# Patient Record
Sex: FEMALE | Race: WHITE | NOT HISPANIC OR LATINO | Employment: FULL TIME | URBAN - METROPOLITAN AREA
[De-identification: names, ages, dates, MRNs, and addresses within clinical notes are randomized per-mention and may not be internally consistent; named-entity substitution may affect disease eponyms.]

---

## 2020-03-23 ENCOUNTER — HOSPITAL ENCOUNTER (EMERGENCY)
Facility: CLINIC | Age: 50
Discharge: HOME | End: 2020-03-23
Attending: EMERGENCY MEDICINE | Admitting: EMERGENCY MEDICINE
Payer: COMMERCIAL

## 2020-03-23 ENCOUNTER — APPOINTMENT (OUTPATIENT)
Dept: GENERAL RADIOLOGY | Facility: CLINIC | Age: 50
End: 2020-03-23
Attending: EMERGENCY MEDICINE
Payer: COMMERCIAL

## 2020-03-23 VITALS
OXYGEN SATURATION: 96 % | WEIGHT: 170 LBS | HEART RATE: 65 BPM | TEMPERATURE: 98.2 F | BODY MASS INDEX: 30.12 KG/M2 | HEIGHT: 63 IN | RESPIRATION RATE: 17 BRPM | SYSTOLIC BLOOD PRESSURE: 124 MMHG | DIASTOLIC BLOOD PRESSURE: 72 MMHG

## 2020-03-23 DIAGNOSIS — R07.9 CHEST PAIN, UNSPECIFIED TYPE: ICD-10-CM

## 2020-03-23 LAB
ANION GAP SERPL CALCULATED.3IONS-SCNC: 5 MMOL/L (ref 3–14)
BASOPHILS # BLD AUTO: 0 10E9/L (ref 0–0.2)
BASOPHILS NFR BLD AUTO: 0.4 %
BUN SERPL-MCNC: 19 MG/DL (ref 7–30)
CALCIUM SERPL-MCNC: 8.9 MG/DL (ref 8.5–10.1)
CHLORIDE SERPL-SCNC: 106 MMOL/L (ref 94–109)
CO2 SERPL-SCNC: 25 MMOL/L (ref 20–32)
CREAT SERPL-MCNC: 0.96 MG/DL (ref 0.52–1.04)
DIFFERENTIAL METHOD BLD: ABNORMAL
EOSINOPHIL # BLD AUTO: 0.1 10E9/L (ref 0–0.7)
EOSINOPHIL NFR BLD AUTO: 1.3 %
ERYTHROCYTE [DISTWIDTH] IN BLOOD BY AUTOMATED COUNT: 11.4 % (ref 10–15)
GFR SERPL CREATININE-BSD FRML MDRD: 70 ML/MIN/{1.73_M2}
GLUCOSE SERPL-MCNC: 120 MG/DL (ref 70–99)
HCT VFR BLD AUTO: 40.7 % (ref 35–47)
HGB BLD-MCNC: 13.7 G/DL (ref 11.7–15.7)
IMM GRANULOCYTES # BLD: 0 10E9/L (ref 0–0.4)
IMM GRANULOCYTES NFR BLD: 0.1 %
LYMPHOCYTES # BLD AUTO: 3.4 10E9/L (ref 0.8–5.3)
LYMPHOCYTES NFR BLD AUTO: 43.1 %
MCH RBC QN AUTO: 33.2 PG (ref 26.5–33)
MCHC RBC AUTO-ENTMCNC: 33.7 G/DL (ref 31.5–36.5)
MCV RBC AUTO: 99 FL (ref 78–100)
MONOCYTES # BLD AUTO: 0.5 10E9/L (ref 0–1.3)
MONOCYTES NFR BLD AUTO: 6 %
NEUTROPHILS # BLD AUTO: 3.8 10E9/L (ref 1.6–8.3)
NEUTROPHILS NFR BLD AUTO: 49.1 %
NRBC # BLD AUTO: 0 10*3/UL
NRBC BLD AUTO-RTO: 0 /100
PLATELET # BLD AUTO: 404 10E9/L (ref 150–450)
POTASSIUM SERPL-SCNC: 3.8 MMOL/L (ref 3.4–5.3)
RBC # BLD AUTO: 4.13 10E12/L (ref 3.8–5.2)
SODIUM SERPL-SCNC: 136 MMOL/L (ref 133–144)
TROPONIN I SERPL-MCNC: <0.015 UG/L (ref 0–0.04)
TROPONIN I SERPL-MCNC: <0.015 UG/L (ref 0–0.04)
WBC # BLD AUTO: 7.8 10E9/L (ref 4–11)

## 2020-03-23 PROCEDURE — 80048 BASIC METABOLIC PNL TOTAL CA: CPT | Performed by: EMERGENCY MEDICINE

## 2020-03-23 PROCEDURE — 99285 EMERGENCY DEPT VISIT HI MDM: CPT | Mod: 25

## 2020-03-23 PROCEDURE — 84484 ASSAY OF TROPONIN QUANT: CPT | Performed by: EMERGENCY MEDICINE

## 2020-03-23 PROCEDURE — 93005 ELECTROCARDIOGRAM TRACING: CPT

## 2020-03-23 PROCEDURE — 25000132 ZZH RX MED GY IP 250 OP 250 PS 637: Performed by: EMERGENCY MEDICINE

## 2020-03-23 PROCEDURE — 85025 COMPLETE CBC W/AUTO DIFF WBC: CPT | Performed by: EMERGENCY MEDICINE

## 2020-03-23 PROCEDURE — 71046 X-RAY EXAM CHEST 2 VIEWS: CPT

## 2020-03-23 RX ORDER — ASPIRIN 81 MG/1
324 TABLET, CHEWABLE ORAL ONCE
Status: COMPLETED | OUTPATIENT
Start: 2020-03-23 | End: 2020-03-23

## 2020-03-23 RX ADMIN — ASPIRIN 81 MG 324 MG: 81 TABLET ORAL at 12:49

## 2020-03-23 ASSESSMENT — ENCOUNTER SYMPTOMS
COUGH: 0
VOMITING: 0
ABDOMINAL PAIN: 0
DIAPHORESIS: 0
FEVER: 0
PALPITATIONS: 1
NERVOUS/ANXIOUS: 1
SHORTNESS OF BREATH: 0
NAUSEA: 0

## 2020-03-23 ASSESSMENT — MIFFLIN-ST. JEOR: SCORE: 1365.24

## 2020-03-23 NOTE — ED TRIAGE NOTES
Pt presents with complaints of L sided chest pain and states she feels as if her heart is racing. Pt denies cardiac hx. Pt states this has been going on for approximately 1 hour.

## 2020-03-23 NOTE — ED AVS SNAPSHOT
Emergency Department  [address] [facility] [address]  Phone:  [phone]  Fax:  [phone]                                    Ninfa Simons   MRN: [id number]    Department:   Emergency Department   Date of Visit:  3/23/2020           After Visit Summary Signature Page    I have received my discharge instructions, and my questions have been answered. I have discussed any challenges I see with this plan with the nurse or doctor.    ..........................................................................................................................................  Patient/Patient Representative Signature      ..........................................................................................................................................  Patient Representative Print Name and Relationship to Patient    ..................................................               ................................................  Date                                   Time    ..........................................................................................................................................  Reviewed by Signature/Title    ...................................................              ..............................................  Date                                               Time          22EPIC Rev 08/18

## 2020-03-23 NOTE — ED PROVIDER NOTES
"  History     Chief Complaint:  Chest Pain     HPI   Ninfa Simons is a 49 year old female who presents for evaluation of chest pain. Today around 1130 the patient was at work when she started to develop brief intermittent episodes of squeezing and stabbing left-sided chest pain with some associated palpitations, described as the sensation of a rapid heart rate. Since then the patient has felt somewhat anxious about her new chest pain, and she checked her blood pressure and found it to be elevated. Due to this new chest pain, the patient came into the ED for evaluation. Otherwise, she denies any recent diaphoresis, fever, cough, shortness of breath, abdominal pain, nausea, or vomiting.     Cardiac Risk Factors:  CAD:    Negative   Hypertension:   Negative   Hyperlipidemia:  Negative   Diabetes:   Negative   Tobacco use:   Negative   Gender:   Female   Age:    49  Familial Hx of CAD:  Negative      Allergies:  NKDA      Medications:    Albuterol inhaler      Past Medical History:    Asthma   Gestational diabetes     Past Surgical History:    History reviewed. No pertinent past surgical history.     Family History:    History reviewed. No pertinent family history.     Social History:  Tobacco use:    Never smoker   Alcohol use:    Positive   Drug use:    Negative   Marital status:    Single   Accompanied to ED by:  Alone       Review of Systems   Constitutional: Negative for diaphoresis and fever.   Respiratory: Negative for cough and shortness of breath.    Cardiovascular: Positive for chest pain and palpitations.   Gastrointestinal: Negative for abdominal pain, nausea and vomiting.   Psychiatric/Behavioral: The patient is nervous/anxious.    All other systems reviewed and are negative.    Physical Exam   First Vitals:  BP: (!) 174/91  Pulse: 102  Heart Rate: 102  Temp: 98.2  F (36.8  C)  Resp: 20  Height: 160 cm (5' 3\")  Weight: 77.1 kg (170 lb)  SpO2: 97 %    Physical Exam  General/Appearance: appears stated age, " well-groomed, appears comfortable but anxious  Eyes: EOMI, no scleral injection, no icterus  ENT: MMM  Neck: supple, nl ROM, no stiffness  Cardiovascular: mild tachy, nl S1S2, no m/r/g, 2+ pulses in all 4 extremities, cap refill <2sec  Respiratory: CTAB, good air movement throughout, no wheezes/rhonchi/rales, no increased WOB, no retractions  Back: no lesions  GI: abd soft, non-distended, nttp,  no HSM, no rebound, no guarding, nl BS  MSK: WALLER, good tone, no bony abnormality  Skin: warm and well-perfused, no rash, no edema, no ecchymosis, nl turgor  Neuro: GCS 15, alert and oriented, no gross focal neuro deficits  Psych: interacts appropriately, anxious  Heme: no petechia, no purpura, no active bleeding     Emergency Department Course   ECG (12:30:32):  Indication: Screening for cardiovascular disease.   Rate 92 bpm. SD interval 154 ms. QRS duration 82 ms. QT/QTc 354/437 ms. P-R-T axes 47 31 27.   Interpretation: Normal sinus rhythm, Normal ECG   Agree with computer interpretation. Yes   Interpreted at 1235 by Dr. Maciel.      Imaging:  Radiographic findings were communicated with the patient who voiced understanding of the findings.    XR Chest:  IMPRESSION: No acute cardiopulmonary disease.   Per radiology.     Laboratory:  CBC: WNL (WBC 7.8, HGB 13.7, )   BMP: Glucose 120 high, o/w WNL (Creatinine 0.96)   Troponin I 1326: <0.015   Troponin I 1555: <0.015     Interventions:  1249 Aspirin 324 mg PO     Emergency Department Course:  Nursing notes and vitals reviewed.  1236: I performed an exam of the patient as documented above.     1344: I updated and reassessed the patient.     1630: I updated and reassessed the patient.     Findings and plan explained to the Patient. Patient discharged home with instructions regarding supportive care, medications, and reasons to return. The importance of close follow-up was reviewed.     Impression & Plan      Medical Decision Making:  This pt presents with chest pain of  unclear etiology.  At this time I do not suspect that there is an acute/dangerous pathology for the chest pain.  They have no significant personal/family history of cardiac ds.  They have no significant cardiac risk factors.  Their EKG and CXR were unremarkable.  Her delta trops were neg and I feel their risk is low-enough to not warrant delta trops; an outpat stresstest has been ordered.  I have also considered PE but they are have no respiratory sxs, no tachypnea or hypoxia, and no pleuritic pain. There are no focal infiltrates, effusions, pulm edema, or evidence of PTX on CXR. The pt has not had recent fevers or viral infections to suggest pericarditis.  They are at low risk for aortic pathology and have nl aortic contour on CXR.  They have not had recent chest trauma.  All of this was discussed with the pt and they were reassurred.  I have advised them to follow-up with their PCP in the next 3 days to get further evaluation, and to return to the ED sooner if their CP continues/worsens, they develop severe SOB/fevers/lightheadedness, or they develop any other new and concerning sxs. At this point the pt is HD stable and appropriate for discharge.      Diagnosis:    ICD-10-CM   1. Chest pain, unspecified type  R07.9     Disposition:  Discharged to home.       I, Enoch Alford, am serving as a scribe at 12:36 PM on 3/23/2020 to document services personally performed by Dr. Maciel, based on my observations and the provider's statements to me.     EMERGENCY DEPARTMENT       Corry Maciel MD  03/23/20 [id number]

## 2020-12-20 ENCOUNTER — HEALTH MAINTENANCE LETTER (OUTPATIENT)
Age: 50
End: 2020-12-20

## 2021-10-03 ENCOUNTER — HEALTH MAINTENANCE LETTER (OUTPATIENT)
Age: 51
End: 2021-10-03

## 2022-01-23 ENCOUNTER — HEALTH MAINTENANCE LETTER (OUTPATIENT)
Age: 52
End: 2022-01-23

## 2022-09-11 ENCOUNTER — HEALTH MAINTENANCE LETTER (OUTPATIENT)
Age: 52
End: 2022-09-11

## 2022-10-26 PROBLEM — Z13.6 CARDIOVASCULAR SCREENING; LDL GOAL LESS THAN 130: Status: ACTIVE | Noted: 2022-10-26

## 2022-10-26 PROBLEM — R73.03 PREDIABETES: Status: ACTIVE | Noted: 2022-10-26

## 2022-10-28 ENCOUNTER — OFFICE VISIT (OUTPATIENT)
Dept: INTERNAL MEDICINE | Facility: CLINIC | Age: 52
End: 2022-10-28
Payer: COMMERCIAL

## 2022-10-28 VITALS
OXYGEN SATURATION: 97 % | BODY MASS INDEX: 30.65 KG/M2 | WEIGHT: 173 LBS | HEIGHT: 63 IN | TEMPERATURE: 97.6 F | DIASTOLIC BLOOD PRESSURE: 89 MMHG | SYSTOLIC BLOOD PRESSURE: 129 MMHG | HEART RATE: 74 BPM

## 2022-10-28 DIAGNOSIS — Z80.41 FAMILY HISTORY OF MALIGNANT NEOPLASM OF OVARY: ICD-10-CM

## 2022-10-28 DIAGNOSIS — Z11.4 SCREENING FOR HIV (HUMAN IMMUNODEFICIENCY VIRUS): ICD-10-CM

## 2022-10-28 DIAGNOSIS — E66.811 CLASS 1 OBESITY DUE TO EXCESS CALORIES WITHOUT SERIOUS COMORBIDITY IN ADULT, UNSPECIFIED BMI: ICD-10-CM

## 2022-10-28 DIAGNOSIS — Z12.31 ENCOUNTER FOR SCREENING MAMMOGRAM FOR BREAST CANCER: ICD-10-CM

## 2022-10-28 DIAGNOSIS — Z00.01 ENCOUNTER FOR GENERAL ADULT MEDICAL EXAMINATION WITH ABNORMAL FINDINGS: Primary | ICD-10-CM

## 2022-10-28 DIAGNOSIS — Z11.59 NEED FOR HEPATITIS C SCREENING TEST: ICD-10-CM

## 2022-10-28 DIAGNOSIS — Z23 NEED FOR PROPHYLACTIC VACCINATION AND INOCULATION AGAINST INFLUENZA: ICD-10-CM

## 2022-10-28 DIAGNOSIS — J45.20 MILD INTERMITTENT ASTHMA WITHOUT COMPLICATION: ICD-10-CM

## 2022-10-28 DIAGNOSIS — E66.09 CLASS 1 OBESITY DUE TO EXCESS CALORIES WITHOUT SERIOUS COMORBIDITY IN ADULT, UNSPECIFIED BMI: ICD-10-CM

## 2022-10-28 DIAGNOSIS — R73.03 PREDIABETES: ICD-10-CM

## 2022-10-28 DIAGNOSIS — Z13.6 CARDIOVASCULAR SCREENING; LDL GOAL LESS THAN 130: ICD-10-CM

## 2022-10-28 DIAGNOSIS — Z12.11 COLON CANCER SCREENING: ICD-10-CM

## 2022-10-28 DIAGNOSIS — Z12.4 SCREENING FOR MALIGNANT NEOPLASM OF CERVIX: ICD-10-CM

## 2022-10-28 DIAGNOSIS — R03.0 ELEVATED BLOOD PRESSURE READING WITHOUT DIAGNOSIS OF HYPERTENSION: ICD-10-CM

## 2022-10-28 DIAGNOSIS — Z12.11 SCREEN FOR COLON CANCER: ICD-10-CM

## 2022-10-28 LAB
ANION GAP SERPL CALCULATED.3IONS-SCNC: 6 MMOL/L (ref 3–14)
BUN SERPL-MCNC: 15 MG/DL (ref 7–30)
CALCIUM SERPL-MCNC: 9.5 MG/DL (ref 8.5–10.1)
CHLORIDE BLD-SCNC: 105 MMOL/L (ref 94–109)
CHOLEST SERPL-MCNC: 241 MG/DL
CO2 SERPL-SCNC: 27 MMOL/L (ref 20–32)
CREAT SERPL-MCNC: 0.84 MG/DL (ref 0.52–1.04)
CREAT UR-MCNC: 140 MG/DL
FASTING STATUS PATIENT QL REPORTED: YES
GFR SERPL CREATININE-BSD FRML MDRD: 83 ML/MIN/1.73M2
GLUCOSE BLD-MCNC: 110 MG/DL (ref 70–99)
HBA1C MFR BLD: 5.8 % (ref 0–5.6)
HCV AB SERPL QL IA: NONREACTIVE
HDLC SERPL-MCNC: 93 MG/DL
HIV 1+2 AB+HIV1 P24 AG SERPL QL IA: NONREACTIVE
LDLC SERPL CALC-MCNC: 135 MG/DL
MICROALBUMIN UR-MCNC: 32 MG/L
MICROALBUMIN/CREAT UR: 22.86 MG/G CR (ref 0–25)
NONHDLC SERPL-MCNC: 148 MG/DL
POTASSIUM BLD-SCNC: 4 MMOL/L (ref 3.4–5.3)
SODIUM SERPL-SCNC: 138 MMOL/L (ref 133–144)
TRIGL SERPL-MCNC: 66 MG/DL
TSH SERPL DL<=0.005 MIU/L-ACNC: 2 MU/L (ref 0.4–4)

## 2022-10-28 PROCEDURE — 83036 HEMOGLOBIN GLYCOSYLATED A1C: CPT | Performed by: INTERNAL MEDICINE

## 2022-10-28 PROCEDURE — 82043 UR ALBUMIN QUANTITATIVE: CPT | Performed by: INTERNAL MEDICINE

## 2022-10-28 PROCEDURE — 90471 IMMUNIZATION ADMIN: CPT | Performed by: INTERNAL MEDICINE

## 2022-10-28 PROCEDURE — 90472 IMMUNIZATION ADMIN EACH ADD: CPT | Performed by: INTERNAL MEDICINE

## 2022-10-28 PROCEDURE — 90682 RIV4 VACC RECOMBINANT DNA IM: CPT | Performed by: INTERNAL MEDICINE

## 2022-10-28 PROCEDURE — 87624 HPV HI-RISK TYP POOLED RSLT: CPT | Performed by: INTERNAL MEDICINE

## 2022-10-28 PROCEDURE — G0145 SCR C/V CYTO,THINLAYER,RESCR: HCPCS | Performed by: INTERNAL MEDICINE

## 2022-10-28 PROCEDURE — 80061 LIPID PANEL: CPT | Performed by: INTERNAL MEDICINE

## 2022-10-28 PROCEDURE — 80048 BASIC METABOLIC PNL TOTAL CA: CPT | Performed by: INTERNAL MEDICINE

## 2022-10-28 PROCEDURE — 99386 PREV VISIT NEW AGE 40-64: CPT | Mod: 25 | Performed by: INTERNAL MEDICINE

## 2022-10-28 PROCEDURE — 99213 OFFICE O/P EST LOW 20 MIN: CPT | Mod: 25 | Performed by: INTERNAL MEDICINE

## 2022-10-28 PROCEDURE — 84443 ASSAY THYROID STIM HORMONE: CPT | Performed by: INTERNAL MEDICINE

## 2022-10-28 PROCEDURE — 86803 HEPATITIS C AB TEST: CPT | Performed by: INTERNAL MEDICINE

## 2022-10-28 PROCEDURE — 87389 HIV-1 AG W/HIV-1&-2 AB AG IA: CPT | Performed by: INTERNAL MEDICINE

## 2022-10-28 PROCEDURE — 36415 COLL VENOUS BLD VENIPUNCTURE: CPT | Performed by: INTERNAL MEDICINE

## 2022-10-28 PROCEDURE — 90715 TDAP VACCINE 7 YRS/> IM: CPT | Performed by: INTERNAL MEDICINE

## 2022-10-28 RX ORDER — ALBUTEROL SULFATE 90 UG/1
2 AEROSOL, METERED RESPIRATORY (INHALATION) EVERY 6 HOURS
Qty: 18 G | Refills: 1 | Status: SHIPPED | OUTPATIENT
Start: 2022-10-28 | End: 2023-10-26

## 2022-10-28 ASSESSMENT — ENCOUNTER SYMPTOMS
CHILLS: 0
SHORTNESS OF BREATH: 0
PALPITATIONS: 0
WEAKNESS: 0
HEADACHES: 0
HEMATOCHEZIA: 0
COUGH: 1
NAUSEA: 0
MYALGIAS: 0
HEARTBURN: 0
PARESTHESIAS: 0
ARTHRALGIAS: 0
CONSTIPATION: 0
HEMATURIA: 0
DIZZINESS: 0
DYSURIA: 0
FEVER: 0
JOINT SWELLING: 0
BREAST MASS: 0
SORE THROAT: 0
DIARRHEA: 0
ABDOMINAL PAIN: 0
EYE PAIN: 0
NERVOUS/ANXIOUS: 0
FREQUENCY: 0

## 2022-10-28 NOTE — PROGRESS NOTES
"   SUBJECTIVE:   CC: Ninfa is an 52 year old who presents for preventive health visit.     51 y/o F here for AFE.    H/o prediabetes,  Intermittent asthma, elevated BP w/o Dx HTN    Works full time for     > cough past 5 weeks.   > had an episode of cp with elevated BP.  Went to ER for w/u, she did not f/u with the stress test.   This a couple years ago.  No episodes since.  ER visit reviewed.    She is very active without any cardiopulmonary symptoms in meantime    > glucose value during 3/2020 ER visit was 120 (non fasting)    > gained about 35# prior to 2020.    She is unable to keep weight at 170 if she \"works out every day\".   Will creep back if does not do this.   She does not restrict calories, but \"does not eat sweets\".   > had OGTT and was \"prdiabetic\"          Patient has been advised of split billing requirements and indicates understanding: Yes  Healthy Habits:     Getting at least 3 servings of Calcium per day:  Yes    Bi-annual eye exam:  Yes    Dental care twice a year:  Yes    Sleep apnea or symptoms of sleep apnea:  Excessive snoring    Diet:  Regular (no restrictions)    Frequency of exercise:  4-5 days/week    Duration of exercise:  Greater than 60 minutes    Taking medications regularly:  Not Applicable    Medication side effects:  Not applicable    PHQ-2 Total Score: 0    Additional concerns today:  Yes      Discuss Blood sugar, weight gain, tingling in her hands and feet, discuss metformin, possible hernia     Today's PHQ-2 Score: No flowsheet data found.    Abuse: Current or Past (Physical, Sexual or Emotional) - No  Do you feel safe in your environment? Yes    Have you ever done Advance Care Planning? (For example, a Health Directive, POLST, or a discussion with a medical provider or your loved ones about your wishes): No, advance care planning information given to patient to review.  Patient plans to discuss their wishes with loved ones or provider.      Social History     Tobacco Use     " Smoking status: Never     Smokeless tobacco: Not on file   Substance Use Topics     Alcohol use: Yes     Comment: sometimes      If you drink alcohol do you typically have >3 drinks per day or >7 drinks per week? No    No flowsheet data found.    Reviewed orders with patient.  Reviewed health maintenance and updated orders accordingly - Yes  Lab work is in process  Labs reviewed in EPIC  BP Readings from Last 3 Encounters:   10/28/22 129/89   03/23/20 124/72   02/20/15 (!) 128/92    Wt Readings from Last 3 Encounters:   10/28/22 78.5 kg (173 lb)   03/23/20 77.1 kg (170 lb)   02/20/15 65.8 kg (145 lb)                  Patient Active Problem List   Diagnosis     CARDIOVASCULAR SCREENING; LDL GOAL LESS THAN 130     Prediabetes     Elevated blood pressure reading without diagnosis of hypertension     History reviewed. No pertinent surgical history.    Social History     Tobacco Use     Smoking status: Never     Smokeless tobacco: Not on file   Substance Use Topics     Alcohol use: Yes     Comment: sometimes      History reviewed. No pertinent family history.      Current Outpatient Medications   Medication Sig Dispense Refill     albuterol (PROAIR HFA/PROVENTIL HFA/VENTOLIN HFA) 108 (90 Base) MCG/ACT inhaler Inhale 2 puffs into the lungs every 6 hours 18 g 1     metFORMIN (GLUCOPHAGE) 500 MG tablet Take 1 tablet (500 mg) by mouth 2 times daily (with meals) 180 tablet 3     No Known Allergies  Recent Labs   Lab Test 03/23/20  1300   CR 0.96   GFRESTIMATED 70   GFRESTBLACK 81   POTASSIUM 3.8        Breast Cancer Screening:  Any new diagnosis of family breast, ovarian, or bowel cancer? Yes         FHS-7: No flowsheet data found.  Her mom had ovarian cancer.  She is not sure if she wants genetic screening.  She is aware that she may be eligible and will let me know     Mammogram Screening: Recommended annual mammography  Pertinent mammograms are reviewed under the imaging tab.    History of abnormal Pap smear: Last 3 Pap  "Results: No results found for: PAP     Reviewed and updated as needed this visit by clinical staff                  Reviewed and updated as needed this visit by Provider                 Past Medical History:   Diagnosis Date     Gestational diabetes      Uncomplicated asthma       History reviewed. No pertinent surgical history.    Review of Systems   Constitutional: Negative for chills and fever.   HENT: Negative for congestion, ear pain, hearing loss and sore throat.    Eyes: Negative for pain and visual disturbance.   Respiratory: Positive for cough. Negative for shortness of breath.    Cardiovascular: Negative for chest pain, palpitations and peripheral edema.   Gastrointestinal: Negative for abdominal pain, constipation, diarrhea, heartburn, hematochezia and nausea.   Breasts:  Negative for tenderness, breast mass and discharge.   Genitourinary: Negative for dysuria, frequency, genital sores, hematuria, pelvic pain, urgency, vaginal bleeding and vaginal discharge.   Musculoskeletal: Negative for arthralgias, joint swelling and myalgias.   Skin: Negative for rash.   Neurological: Negative for dizziness, weakness, headaches and paresthesias.   Psychiatric/Behavioral: Negative for mood changes. The patient is not nervous/anxious.            OBJECTIVE:   /89 (BP Location: Right arm, Patient Position: Sitting, Cuff Size: Adult Regular)   Pulse 74   Temp 97.6  F (36.4  C) (Oral)   Ht 1.588 m (5' 2.5\")   Wt 78.5 kg (173 lb)   SpO2 97%   BMI 31.14 kg/m    Physical Exam  GENERAL APPEARANCE: healthy, alert and no distress  EYES: Eyes grossly normal to inspection, PERRL and conjunctivae and sclerae normal  HENT: ear canals and TM's normal, nose and mouth without ulcers or lesions, oropharynx clear and oral mucous membranes moist  NECK: no adenopathy, no asymmetry, masses, or scars and thyroid normal to palpation  RESP: lungs clear to auscultation - no rales, rhonchi or wheezes  BREAST: normal without masses, " tenderness or nipple discharge and no palpable axillary masses or adenopathy  CV: regular rate and rhythm, normal S1 S2, no S3 or S4, no murmur, click or rub, no peripheral edema and peripheral pulses strong  ABDOMEN: soft, nontender, no hepatosplenomegaly, no masses and bowel sounds normal   (female): normal female external genitalia, normal urethral meatus, vaginal mucosal atrophy noted, normal cervix, adnexae, and uterus without masses or abnormal discharge   (female):  Pap done with excellent view of cervix  MS: no musculoskeletal defects are noted and gait is age appropriate without ataxia  SKIN: no suspicious lesions or rashes  NEURO: Normal strength and tone, sensory exam grossly normal, mentation intact and speech normal  PSYCH: mentation appears normal and affect normal/bright   She was initially tearful due to some anxiety about her health in general.  Content of speech = wants to be preventative and avoid chronic disease such as DM     Diagnostic Test Results:  Labs reviewed in Epic  No results found for this or any previous visit (from the past 24 hour(s)).  See orders, pending.     ASSESSMENT/PLAN:   (Z00.01) Encounter for general adult medical examination with abnormal findings  (primary encounter diagnosis)  Comment:  Generally healthy 53 y/o F, resuming her preventative health.   Plan:  Below:     (Z13.6) CARDIOVASCULAR SCREENING; LDL GOAL LESS THAN 130  Comment:    Plan: Lipid panel reflex to direct LDL Fasting             (R73.03) Prediabetes   Reports abnormal OGTTs, gestational DM history.    Comment: discussed DM diet.  She is already exercising.  Add metformin for presumed insulin resistance  Plan: Hemoglobin A1c, TSH with free T4 reflex,         metFORMIN (GLUCOPHAGE) 500 MG tablet, Albumin         Random Urine Quantitative with Creat Ratio             (Z12.11) Colon cancer screening  Comment:  Due      Plan: Colonoscopy Screening  Referral             (E66.09) Class 1 obesity due  "to excess calories without serious comorbidity in adult, unspecified BMI  Comment:  Discussed.    Plan: embracing DM diet, will result in weight loss if followed.      (R03.0) Elevated blood pressure reading without diagnosis of hypertension  Comment:  I think she had COVID in march 2020   No HTN or chest symptoms since.    Plan:      (J45.20) Mild intermittent asthma without complication  Comment:  Needs RF   Plan: albuterol (PROAIR HFA/PROVENTIL HFA/VENTOLIN         HFA) 108 (90 Base) MCG/ACT inhaler             (Z12.11) Screen for colon cancer  Comment:    Plan: Colonoscopy Screening  Referral             (Z11.4) Screening for HIV (human immunodeficiency virus)  Comment: agreeable   Plan: HIV Antigen Antibody Combo             (Z11.59) Need for hepatitis C screening test  Comment: agreeable   Plan: Hepatitis C Screen Reflex to HCV RNA Quant and         Genotype             (Z12.4) Screening for malignant neoplasm of cervix  Comment:  PAP done with excellent view of cervix   Plan:      (Z12.31) Encounter for screening mammogram for breast cancer  Comment:    Plan: *MA Screening Digital Bilateral, Pap screen         with HPV - recommended age 30 - 65 years             (Z23) Need for prophylactic vaccination and inoculation against influenza  Comment:  Done   Plan: INFLUENZA QUAD, RECOMBINANT, P-FREE (RIV4)         (FLUBLOK)             (Z80.41) Family history of malignant neoplasm of ovary  Comment:  She knows she is possibly eligible for genetic screening   Plan: wants to defer decision. She is invited to let us know at anytime if referal desired.           COUNSELING:  Reviewed preventive health counseling, as reflected in patient instructions       Healthy diet/nutrition       Immunizations    Vaccinated for: Influenza          Estimated body mass index is 30.11 kg/m  as calculated from the following:    Height as of 3/23/20: 1.6 m (5' 3\").    Weight as of 3/23/20: 77.1 kg (170 lb).    Weight management " plan: embrace a 1800 k[place]/day DM diet.      She reports that she has never smoked. She does not have any smokeless tobacco history on file.      Counseling Resources:  ATP IV Guidelines  Pooled Cohorts Equation Calculator  Breast Cancer Risk Calculator  BRCA-Related Cancer Risk Assessment: FHS-7 Tool  FRAX Risk Assessment  ICSI Preventive Guidelines  Dietary Guidelines for Americans, 2010  food[email or web address]'s MyPlate  ASA Prophylaxis  Lung CA Screening    Sarah Davila MD  [facility]

## 2022-10-28 NOTE — PATIENT INSTRUCTIONS
"  1800 or 2000 ADA diabetic diet    Trial Metformin 500 mg       Take two with supper daily    Colonoscopy will call you    Call to schedule imaging  OR 1 [phone];  Mammogram    Return to clinic 6 months follow up    \"Ventral hernia\"   consider surgery referral in future.     You are eligible for shingrix vaccine series... do via pharmacy at your convenience.       "

## 2022-10-29 ENCOUNTER — MYC MEDICAL ADVICE (OUTPATIENT)
Dept: INTERNAL MEDICINE | Facility: CLINIC | Age: 52
End: 2022-10-29

## 2022-10-31 NOTE — RESULT ENCOUNTER NOTE
"Ninfa Simons    The kidney function is normal:  no concerns  The HgbA1C is in the \"prediabetes\" range.   Preventing onset of diabetes would best be achieved with weight loss of a few pounds as discussed as well as embracing a low calorie diabetes diet.  As discussed, we will start metformin to help with improving insulin resistance.     Cholesterol is elevated, if you want to start a low dose cholesterol medication (atorvastatin for example) let me know and we can start.       Thyroid, electrolytes and kidney function look great  HIV and Hepatitis C screening are negative/normal.   Thank you for participating in this population screening effort.     Sincerely,       MARY SHERMAN M.D.  "

## 2022-11-01 LAB
BKR LAB AP GYN ADEQUACY: NORMAL
BKR LAB AP GYN INTERPRETATION: NORMAL
BKR LAB AP HPV REFLEX: NORMAL
BKR LAB AP PREVIOUS ABNORMAL: NORMAL
PATH REPORT.COMMENTS IMP SPEC: NORMAL
PATH REPORT.COMMENTS IMP SPEC: NORMAL
PATH REPORT.RELEVANT HX SPEC: NORMAL

## 2022-11-02 LAB
HUMAN PAPILLOMA VIRUS 16 DNA: NEGATIVE
HUMAN PAPILLOMA VIRUS 18 DNA: NEGATIVE
HUMAN PAPILLOMA VIRUS FINAL DIAGNOSIS: NORMAL
HUMAN PAPILLOMA VIRUS OTHER HR: NEGATIVE

## 2023-04-30 ENCOUNTER — HEALTH MAINTENANCE LETTER (OUTPATIENT)
Age: 53
End: 2023-04-30

## 2023-10-26 ENCOUNTER — OFFICE VISIT (OUTPATIENT)
Dept: FAMILY MEDICINE | Facility: CLINIC | Age: 53
End: 2023-10-26
Payer: COMMERCIAL

## 2023-10-26 VITALS
SYSTOLIC BLOOD PRESSURE: 136 MMHG | BODY MASS INDEX: 31.47 KG/M2 | HEIGHT: 63 IN | OXYGEN SATURATION: 96 % | HEART RATE: 76 BPM | WEIGHT: 177.6 LBS | DIASTOLIC BLOOD PRESSURE: 84 MMHG | RESPIRATION RATE: 16 BRPM | TEMPERATURE: 97 F

## 2023-10-26 DIAGNOSIS — J45.20 MILD INTERMITTENT ASTHMA WITHOUT COMPLICATION: ICD-10-CM

## 2023-10-26 DIAGNOSIS — Z12.11 SCREEN FOR COLON CANCER: Chronic | ICD-10-CM

## 2023-10-26 DIAGNOSIS — Z13.6 CARDIOVASCULAR SCREENING; LDL GOAL LESS THAN 130: ICD-10-CM

## 2023-10-26 DIAGNOSIS — Z12.31 ENCOUNTER FOR SCREENING MAMMOGRAM FOR BREAST CANCER: ICD-10-CM

## 2023-10-26 DIAGNOSIS — Z13.6 SCREENING FOR HEART DISEASE: ICD-10-CM

## 2023-10-26 DIAGNOSIS — R73.03 PREDIABETES: Primary | ICD-10-CM

## 2023-10-26 LAB — HBA1C MFR BLD: 5.8 % (ref 0–5.6)

## 2023-10-26 PROCEDURE — 80053 COMPREHEN METABOLIC PANEL: CPT | Performed by: FAMILY MEDICINE

## 2023-10-26 PROCEDURE — 99204 OFFICE O/P NEW MOD 45 MIN: CPT | Performed by: FAMILY MEDICINE

## 2023-10-26 PROCEDURE — 83036 HEMOGLOBIN GLYCOSYLATED A1C: CPT | Performed by: FAMILY MEDICINE

## 2023-10-26 PROCEDURE — 80061 LIPID PANEL: CPT | Performed by: FAMILY MEDICINE

## 2023-10-26 PROCEDURE — 36415 COLL VENOUS BLD VENIPUNCTURE: CPT | Performed by: FAMILY MEDICINE

## 2023-10-26 RX ORDER — ALBUTEROL SULFATE 90 UG/1
2 AEROSOL, METERED RESPIRATORY (INHALATION) EVERY 6 HOURS
Qty: 18 G | Refills: 1 | Status: SHIPPED | OUTPATIENT
Start: 2023-10-26

## 2023-10-26 ASSESSMENT — PAIN SCALES - GENERAL: PAINLEVEL: NO PAIN (0)

## 2023-10-26 ASSESSMENT — ASTHMA QUESTIONNAIRES: ACT_TOTALSCORE: 23

## 2023-10-26 NOTE — PROGRESS NOTES
Assessment & Plan     Prediabetes  Continues in the prediabetic range with current metformin treatment.  Discussed other treatment options for assistance with weight loss.  Increase in exercise and dietary measures are planned at this time.  - HEMOGLOBIN A1C; Future  - metFORMIN (GLUCOPHAGE) 500 MG tablet; Take 1 tablet (500 mg) by mouth 2 times daily (with meals)  - Comprehensive metabolic panel (BMP + Alb, Alk Phos, ALT, AST, Total. Bili, TP); Future  - HEMOGLOBIN A1C  - Comprehensive metabolic panel (BMP + Alb, Alk Phos, ALT, AST, Total. Bili, TP)    Mild intermittent asthma without complication  Asthma under good control with occasional use of albuterol.  Refills given.  - albuterol (PROAIR HFA/PROVENTIL HFA/VENTOLIN HFA) 108 (90 Base) MCG/ACT inhaler; Inhale 2 puffs into the lungs every 6 hours    CARDIOVASCULAR SCREENING; LDL GOAL LESS THAN 130  The 10-year ASCVD risk score (Raymond GREENWOOD, et al., 2019) is: 1.4%    Values used to calculate the score:      Age: 53 years      Sex: Female      Is Non- : No      Diabetic: No      Tobacco smoker: No      Systolic Blood Pressure: 136 mmHg      Is BP treated: No      HDL Cholesterol: 89 mg/dL      Total Cholesterol: 251 mg/dL   - Lipid panel reflex to direct LDL Fasting; Future  - Lipid panel reflex to direct LDL Fasting    Screening for heart disease  Overall risk for heart disease is relatively low but we discussed other assessments and she is interested in potentially pursuing a coronary artery calcium scan for more long-term assessment.  Order placed and she will check her insurance coverage to determine if she will pursue this testing.  - CT Coronary Calcium Scan; Future    Screen for colon cancer  - Colonoscopy Screening  Referral; Future    Encounter for screening mammogram for breast cancer  - *MA Screening Digital Bilateral; Future    Ordering of each unique test  Prescription drug management         BMI:   Estimated body mass  "index is 31.97 kg/m  as calculated from the following:    Height as of this encounter: 1.588 m (5' 2.5\").    Weight as of this encounter: 80.6 kg (177 lb 9.6 oz).   Weight management plan: Discussed healthy diet and exercise guidelines    Patient Instructions   Lab today.    Refill medications.    For heart disease screening consider calcium scan.   You can call [phone] to schedule this at the [facility] building in [place] - someone will call to schedule this.     Deanna Basurto MD  [facility]    Tima Ocasio is a 53 year old, presenting for the following health issues:  Recheck Medication        10/26/2023    10:16 AM   Additional Questions   Roomed by DANY Duncan   Accompanied by Self         10/26/2023    10:16 AM   Patient Reported Additional Medications   Patient reports taking the following new medications None       History of Present Illness       Reason for visit:  Blood sugar issues/med refill    She eats 2-3 servings of fruits and vegetables daily.She consumes 0 sweetened beverage(s) daily.She exercises with enough effort to increase her heart rate 20 to 29 minutes per day.  She exercises with enough effort to increase her heart rate 4 days per week.   She is taking medications regularly.         Asthma Follow-Up    Was ACT completed today?  Yes        10/26/2023    10:13 AM   ACT Total Scores   ACT TOTAL SCORE (Goal Greater than or Equal to 20) 23   In the past 12 months, how many times did you visit the emergency room for your asthma without being admitted to the hospital? 0   In the past 12 months, how many times were you hospitalized overnight because of your asthma? 0        How many days per week do you miss taking your asthma controller medication?  I do not have an asthma controller medication  Please describe any recent triggers for your asthma: exercise or sports  Have you had any Emergency Room Visits, Urgent Care Visits, or Hospital " "Admissions since your last office visit?  No  Dizziness and fatigue and nausea with return to kickboxing.  Palpitations initially.  No symptoms now except cough in the initial onset of exercise.  Improved with albuterol use.     Prediabetes:  Has been treated with metformin over the last year for prediabetes concerns.  She has tolerated the metformin well.  Has continued to struggle with difficulty losing weight.  Wt Readings from Last 5 Encounters:   10/26/23 80.6 kg (177 lb 9.6 oz)   10/28/22 78.5 kg (173 lb)   03/23/20 77.1 kg (170 lb)   02/20/15 65.8 kg (145 lb)             The 10-year ASCVD risk score (Raymond GREENWOOD, et al., 2019) is: 1.4%    Values used to calculate the score:      Age: 53 years      Sex: Female      Is Non- : No      Diabetic: No      Tobacco smoker: No      Systolic Blood Pressure: 136 mmHg      Is BP treated: No      HDL Cholesterol: 89 mg/dL      Total Cholesterol: 251 mg/dL    Review of Systems   Constitutional, HEENT, cardiovascular, pulmonary, gi and gu systems are negative, except as otherwise noted.      Objective    /84   Pulse 76   Temp 97  F (36.1  C) (Tympanic)   Resp 16   Ht 1.588 m (5' 2.5\")   Wt 80.6 kg (177 lb 9.6 oz)   LMP 08/01/2023 (Approximate)   SpO2 96%   BMI 31.97 kg/m    Body mass index is 31.97 kg/m .  Physical Exam   GENERAL: alert, no distress, and obese  NECK: no adenopathy, no asymmetry, masses, or scars and thyroid normal to palpation  RESP: lungs clear to auscultation - no rales, rhonchi or wheezes  CV: regular rate and rhythm, normal S1 S2, no S3 or S4, no murmur, click or rub, no peripheral edema and peripheral pulses strong  ABDOMEN: soft, nontender, no hepatosplenomegaly, no masses and bowel sounds normal  MS: no gross musculoskeletal defects noted, no edema  NEURO: Normal strength and tone, mentation intact and speech normal  BACK: no CVA tenderness, no paralumbar tenderness  PSYCH: mentation appears normal, affect " normal/bright    Results for orders placed or performed in visit on 10/26/23   HEMOGLOBIN A1C     Status: Abnormal   Result Value Ref Range    Hemoglobin A1C 5.8 (H) 0.0 - 5.6 %   Lipid panel reflex to direct LDL Fasting     Status: Abnormal   Result Value Ref Range    Cholesterol 251 (H) <200 mg/dL    Triglycerides 79 <150 mg/dL    Direct Measure HDL 89 >=50 mg/dL    LDL Cholesterol Calculated 146 (H) <=100 mg/dL    Non HDL Cholesterol 162 (H) <130 mg/dL    Narrative    Cholesterol  Desirable:  <200 mg/dL    Triglycerides  Normal:  Less than 150 mg/dL  Borderline High:  150-199 mg/dL  High:  200-499 mg/dL  Very High:  Greater than or equal to 500 mg/dL    Direct Measure HDL  Female:  Greater than or equal to 50 mg/dL   Male:  Greater than or equal to 40 mg/dL    LDL Cholesterol  Desirable:  <100mg/dL  Above Desirable:  100-129 mg/dL   Borderline High:  130-159 mg/dL   High:  160-189 mg/dL   Very High:  >= 190 mg/dL    Non HDL Cholesterol  Desirable:  130 mg/dL  Above Desirable:  130-159 mg/dL  Borderline High:  160-189 mg/dL  High:  190-219 mg/dL  Very High:  Greater than or equal to 220 mg/dL   Comprehensive metabolic panel (BMP + Alb, Alk Phos, ALT, AST, Total. Bili, TP)     Status: Abnormal   Result Value Ref Range    Sodium 139 135 - 145 mmol/L    Potassium 4.3 3.4 - 5.3 mmol/L    Carbon Dioxide (CO2) 23 22 - 29 mmol/L    Anion Gap 14 7 - 15 mmol/L    Urea Nitrogen 15.1 6.0 - 20.0 mg/dL    Creatinine 0.91 0.51 - 0.95 mg/dL    GFR Estimate 75 >60 mL/min/1.73m2    Calcium 9.6 8.6 - 10.0 mg/dL    Chloride 102 98 - 107 mmol/L    Glucose 105 (H) 70 - 99 mg/dL    Alkaline Phosphatase 52 35 - 104 U/L    AST 21 0 - 45 U/L    ALT 19 0 - 50 U/L    Protein Total 7.4 6.4 - 8.3 g/dL    Albumin 4.5 3.5 - 5.2 g/dL    Bilirubin Total 0.4 <=1.2 mg/dL

## 2023-10-26 NOTE — LETTER
My Asthma Action Plan    Name: Ninfa Simons   YOB: 1970  Date: 10/27/2023   My doctor: Deanna Basurto MD   My clinic: [facility]        My Rescue Medicine:   Albuterol inhaler (Proair/Ventolin/Proventil HFA)  2-4 puffs EVERY 4 HOURS as needed. Use a spacer if recommended by your provider.   My Asthma Severity:   Intermittent / Exercise Induced  Know your asthma triggers: exercise or sports             GREEN ZONE   Good Control  I feel good  No cough or wheeze  Can work, sleep and play without asthma symptoms       Take your asthma control medicine every day.     If exercise triggers your asthma, take your rescue medication  15 minutes before exercise or sports, and  During exercise if you have asthma symptoms  Spacer to use with inhaler: If you have a spacer, make sure to use it with your inhaler             YELLOW ZONE Getting Worse  I have ANY of these:  I do not feel good  Cough or wheeze  Chest feels tight  Wake up at night   Keep taking your Green Zone medications  Start taking your rescue medicine:  every 20 minutes for up to 1 hour. Then every 4 hours for 24-48 hours.  If you stay in the Yellow Zone for more than 12-24 hours, contact your doctor.  If you do not return to the Green Zone in 12-24 hours or you get worse, start taking your oral steroid medicine if prescribed by your provider.           RED ZONE Medical Alert - Get Help  I have ANY of these:  I feel awful  Medicine is not helping  Breathing getting harder  Trouble walking or talking  Nose opens wide to breathe       Take your rescue medicine NOW  If your provider has prescribed an oral steroid medicine, start taking it NOW  Call your doctor NOW  If you are still in the Red Zone after 20 minutes and you have not reached your doctor:  Take your rescue medicine again and  Call 911 or go to the emergency room right away    See your regular doctor within 2 weeks of an Emergency Room or Urgent Care visit  for follow-up treatment.          Annual Reminders:  Meet with Asthma Educator,  Flu Shot in the Fall, consider Pneumonia Vaccination for patients with asthma (aged 19 and older).    [facility] [place] [place] [facility] [id number] [address] [place] [address]    Electronically signed by Deanna Basurto MD   Date: 10/27/23                    Asthma Triggers  How To Control Things That Make Your Asthma Worse    Triggers are things that make your asthma worse.  Look at the list below to help you find your triggers and   what you can do about them. You can help prevent asthma flare-ups by staying away from your triggers.      Trigger                                                          What you can do   Cigarette Smoke  Tobacco smoke can make asthma worse. Do not allow smoking in your home, car or around you.  Be sure no one smokes at a child s day care or school.  If you smoke, ask your health care provider for ways to help you quit.  Ask family members to quit too.  Ask your health care provider for a referral to Quit Plan to help you quit smoking, or call 6-859-930-PLAN.     Colds, Flu, Bronchitis  These are common triggers of asthma. Wash your hands often.  Don t touch your eyes, nose or mouth.  Get a flu shot every year.     Dust Mites  These are tiny bugs that live in cloth or carpet. They are too small to see. Wash sheets and blankets in hot water every week.   Encase pillows and mattress in dust mite proof covers.  Avoid having carpet if you can. If you have carpet, vacuum weekly.   Use a dust mask and HEPA vacuum.   Pollen and Outdoor Mold  Some people are allergic to trees, grass, or weed pollen, or molds. Try to keep your windows closed.  Limit time out doors when pollen count is high.   Ask you health care provider about taking medicine during allergy season.     Animal Dander  Some people are allergic to skin flakes, urine or saliva from pets with fur or feathers. Keep pets with fur or feathers out of  your home.    If you can t keep the pet outdoors, then keep the pet out of your bedroom.  Keep the bedroom door closed.  Keep pets off cloth furniture and away from stuffed toys.     Mice, Rats, and Cockroaches  Some people are allergic to the waste from these pests.   Cover food and garbage.  Clean up spills and food crumbs.  Store grease in the refrigerator.   Keep food out of the bedroom.   Indoor Mold  This can be a trigger if your home has high moisture. Fix leaking faucets, pipes, or other sources of water.   Clean moldy surfaces.  Dehumidify basement if it is damp and smelly.   Smoke, Strong Odors, and Sprays  These can reduce air quality. Stay away from strong odors and sprays, such as perfume, powder, hair spray, paints, smoke incense, paint, cleaning products, candles and new carpet.   Exercise or Sports  Some people with asthma have this trigger. Be active!  Ask your doctor about taking medicine before sports or exercise to prevent symptoms.    Warm up for 5-10 minutes before and after sports or exercise.     Other Triggers of Asthma  Cold air:  Cover your nose and mouth with a scarf.  Sometimes laughing or crying can be a trigger.  Some medicines and food can trigger asthma.

## 2023-10-26 NOTE — PATIENT INSTRUCTIONS
Lab today.    Refill medications.    For heart disease screening consider calcium scan.   You can call [phone] to schedule this at the [facility] building in [place]     Colonoscopy - someone will call to schedule this.

## 2023-10-27 LAB
ALBUMIN SERPL BCG-MCNC: 4.5 G/DL (ref 3.5–5.2)
ALP SERPL-CCNC: 52 U/L (ref 35–104)
ALT SERPL W P-5'-P-CCNC: 19 U/L (ref 0–50)
ANION GAP SERPL CALCULATED.3IONS-SCNC: 14 MMOL/L (ref 7–15)
AST SERPL W P-5'-P-CCNC: 21 U/L (ref 0–45)
BILIRUB SERPL-MCNC: 0.4 MG/DL
BUN SERPL-MCNC: 15.1 MG/DL (ref 6–20)
CALCIUM SERPL-MCNC: 9.6 MG/DL (ref 8.6–10)
CHLORIDE SERPL-SCNC: 102 MMOL/L (ref 98–107)
CHOLEST SERPL-MCNC: 251 MG/DL
CREAT SERPL-MCNC: 0.91 MG/DL (ref 0.51–0.95)
DEPRECATED HCO3 PLAS-SCNC: 23 MMOL/L (ref 22–29)
EGFRCR SERPLBLD CKD-EPI 2021: 75 ML/MIN/1.73M2
GLUCOSE SERPL-MCNC: 105 MG/DL (ref 70–99)
HDLC SERPL-MCNC: 89 MG/DL
LDLC SERPL CALC-MCNC: 146 MG/DL
NONHDLC SERPL-MCNC: 162 MG/DL
POTASSIUM SERPL-SCNC: 4.3 MMOL/L (ref 3.4–5.3)
PROT SERPL-MCNC: 7.4 G/DL (ref 6.4–8.3)
SODIUM SERPL-SCNC: 139 MMOL/L (ref 135–145)
TRIGL SERPL-MCNC: 79 MG/DL

## 2023-10-27 NOTE — RESULT ENCOUNTER NOTE
The 10-year ASCVD risk score (Raymond GREENWOOD, et al., 2019) is: 1.4%    Values used to calculate the score:      Age: 53 years      Sex: Female      Is Non- : No      Diabetic: No      Tobacco smoker: No      Systolic Blood Pressure: 136 mmHg      Is BP treated: No      HDL Cholesterol: 89 mg/dL      Total Cholesterol: 251 mg/dL   Your cholesterol levels are similar to previous.  HDL (good cholesterol) is still very high which is protective against heart disease for you.  LDL cholesterol (bad cholesterol) is in the borderline high range.  Overall risk for heart disease remains low based on these results and assessment of other risk factors.  Healthy diet and exercise is recommended.  The coronary artery calcium scan that we discussed at your visit may give some additional insight into cardiac risk if you determined to have that done.  Otherwise repeat cholesterol testing in a year is recommended.  Your kidney and liver testing were normal.  Blood sugar testing with the long-term blood sugar test (A1c) is unchanged from a year ago and you remain in the prediabetic range.  Continued metformin use is recommended.  Please call or MyChart message me if you have any questions.      ARNIE

## 2023-12-10 ENCOUNTER — HEALTH MAINTENANCE LETTER (OUTPATIENT)
Age: 53
End: 2023-12-10

## 2023-12-18 ENCOUNTER — OFFICE VISIT (OUTPATIENT)
Dept: INTERNAL MEDICINE | Facility: CLINIC | Age: 53
End: 2023-12-18
Payer: COMMERCIAL

## 2023-12-18 VITALS
DIASTOLIC BLOOD PRESSURE: 90 MMHG | HEART RATE: 77 BPM | OXYGEN SATURATION: 97 % | SYSTOLIC BLOOD PRESSURE: 140 MMHG | TEMPERATURE: 98 F | BODY MASS INDEX: 31.32 KG/M2 | WEIGHT: 174 LBS

## 2023-12-18 DIAGNOSIS — Z00.00 PREVENTATIVE HEALTH CARE: Primary | ICD-10-CM

## 2023-12-18 DIAGNOSIS — Z12.11 SCREEN FOR COLON CANCER: ICD-10-CM

## 2023-12-18 DIAGNOSIS — R42 VERTIGO: ICD-10-CM

## 2023-12-18 PROCEDURE — 99396 PREV VISIT EST AGE 40-64: CPT | Performed by: INTERNAL MEDICINE

## 2023-12-18 ASSESSMENT — ENCOUNTER SYMPTOMS
DIARRHEA: 0
EYE PAIN: 0
SHORTNESS OF BREATH: 0
ARTHRALGIAS: 0
FREQUENCY: 0
WEAKNESS: 0
BREAST MASS: 0
PARESTHESIAS: 0
HEARTBURN: 0
JOINT SWELLING: 0
COUGH: 1
NERVOUS/ANXIOUS: 0
SORE THROAT: 0
DIZZINESS: 1
HEMATOCHEZIA: 0
PALPITATIONS: 0
HEMATURIA: 0
ABDOMINAL PAIN: 0
FEVER: 0
CHILLS: 0
MYALGIAS: 0
CONSTIPATION: 0
NAUSEA: 0
DYSURIA: 0
HEADACHES: 0

## 2023-12-18 ASSESSMENT — ASTHMA QUESTIONNAIRES: ACT_TOTALSCORE: 23

## 2023-12-18 NOTE — PROGRESS NOTES
SUBJECTIVE:   Ninfa is a 53 year old, presenting for the following:    Fasting.     Physical        12/18/2023     4:49 PM   Additional Questions   Roomed by TED De La Cruz LPN   Accompanied by self         12/18/2023     4:49 PM   Patient Reported Additional Medications   Patient reports taking the following new medications none       Healthy Habits:     Getting at least 3 servings of Calcium per day:  Yes    Bi-annual eye exam:  Yes    Dental care twice a year:  Yes    Sleep apnea or symptoms of sleep apnea:  Excessive snoring    Diet:  Regular (no restrictions)    Frequency of exercise:  2-3 days/week    Duration of exercise:  15-30 minutes    Taking medications regularly:  Yes    Medication side effects:  None    Additional concerns today:  Yes      Today's PHQ-2 Score:       12/18/2023     4:29 PM   PHQ-2 ( 1999 [employer])   Q1: Little interest or pleasure in doing things 0   Q2: Feeling down, depressed or hopeless 0   PHQ-2 Score 0   Q1: Little interest or pleasure in doing things Not at all   Q2: Feeling down, depressed or hopeless Not at all   PHQ-2 Score 0           Social History     Tobacco Use    Smoking status: Never    Smokeless tobacco: Not on file   Substance Use Topics    Alcohol use: Yes     Comment: sometimes              12/18/2023     4:29 PM   Alcohol Use   Prescreen: >3 drinks/day or >7 drinks/week? No     Reviewed orders with patient.  Reviewed health maintenance and updated orders accordingly - Yes  BP Readings from Last 3 Encounters:   12/18/23 (!) 140/90   10/26/23 136/84   10/28/22 129/89    Wt Readings from Last 3 Encounters:   12/18/23 78.9 kg (174 lb)   10/26/23 80.6 kg (177 lb 9.6 oz)   10/28/22 78.5 kg (173 lb)                    Breast Cancer Screening:    FHS-7:       10/28/2022     8:24 AM 12/18/2023     4:32 PM   Breast CA Risk Assessment (FHS-7)   Did any of your first-degree relatives have breast or ovarian cancer? Yes Yes   Did any of your relatives have bilateral breast cancer?  No No   Did any man in your family have breast cancer? No No   Did any woman in your family have breast and ovarian cancer? Yes Yes   Did any woman in your family have breast cancer before age 50 y? No No   Do you have 2 or more relatives with breast and/or ovarian cancer? No No   Do you have 2 or more relatives with breast and/or bowel cancer? No No       Mammogram Screening: Recommended annual mammography  Pertinent mammograms are reviewed under the imaging tab.    History of abnormal Pap smear: NO - age 30-65 PAP every 5 years with negative HPV co-testing recommended      Latest Ref Rng & Units 10/28/2022     9:07 AM   PAP / HPV   PAP  Negative for Intraepithelial Lesion or Malignancy (NILM)    HPV 16 DNA Negative Negative    HPV 18 DNA Negative Negative    Other HR HPV Negative Negative      Reviewed and updated as needed this visit by clinical staff   Tobacco  Allergies  Meds  Problems  Med Hx  Surg Hx  Fam Hx          Reviewed and updated as needed this visit by Provider                     Review of Systems   Constitutional:  Negative for chills and fever.   HENT:  Negative for congestion, ear pain, hearing loss and sore throat.    Eyes:  Negative for pain and visual disturbance.   Respiratory:  Positive for cough. Negative for shortness of breath.    Cardiovascular:  Negative for chest pain, palpitations and peripheral edema.   Gastrointestinal:  Negative for abdominal pain, constipation, diarrhea, heartburn, hematochezia and nausea.   Breasts:  Negative for tenderness, breast mass and discharge.   Genitourinary:  Negative for dysuria, frequency, genital sores, hematuria, pelvic pain, urgency, vaginal bleeding and vaginal discharge.   Musculoskeletal:  Negative for arthralgias, joint swelling and myalgias.   Skin:  Negative for rash.   Neurological:  Positive for dizziness. Negative for weakness, headaches and paresthesias.   Psychiatric/Behavioral:  Negative for mood changes. The patient is not  nervous/anxious.      IN November while at work she had the sudden onset of just not feeling right. She had some lightheadedness chest pressure and eventually nausea. A few days later when she still did not feel good she went to the ER. Work up was negative but then later she Varivax booster discussed but in short supply-will give at later date severe dizziness to the point she could not work and could hardly walk. She di find one position she was comfortable in. About 10 days later she went o urgent care for severe dizziness and nausea. She was treated with meclizine and sent home. Her symptoms have lessoned but still does not feel right. And she says she is getting intermittent moderate headaches.     She does do kick boxing 2-3 x per week. She has a history of migraine headaches but has not had them for years.     Denies excessive or increased stress.      OBJECTIVE:   BP (!) 144/93   Pulse 77   Temp 98  F (36.7  C) (Oral)   Wt 78.9 kg (174 lb)   LMP 08/01/2023 (Approximate)   SpO2 97%   Breastfeeding No   BMI 31.32 kg/m    Physical Exam  GENERAL: healthy, alert and no distress  EYES: Eyes grossly normal to inspection, PERRL and conjunctivae and sclerae normal  HENT: ear canals and TM's normal, nose and mouth without ulcers or lesions  NECK: no adenopathy, no asymmetry, masses, or scars and thyroid normal to palpation  RESP: lungs clear to auscultation - no rales, rhonchi or wheezes  CV: regular rate and rhythm, normal S1 S2, no S3 or S4, no murmur, click or rub, no peripheral edema and peripheral pulses strong  ABDOMEN: soft, nontender, no hepatosplenomegaly, no masses and bowel sounds normal  MS: no gross musculoskeletal defects noted, no edema  SKIN: no suspicious lesions or rashes  NEURO: Normal strength and tone, mentation intact and speech normal  PSYCH: mentation appears normal, affect normal/bright      ASSESSMENT/PLAN:   (Z00.00) Preventative health care  (primary encounter diagnosis)  Comment:     Plan: labs up to date    (R42) Vertigo  Comment: Will check MRI and refer to ENT. Possible this is all benign positional vertigo related to kick boxing but symptoms seem extreme for that.   Plan: MR Brain w/o & w Contrast, Adult ENT          Referral             (Z12.11) Screen for colon cancer  Comment: will hold on colonoscopy until vertigo issue settled.   Plan: Colonoscopy Screening  Referral             Patient has been advised of split billing requirements and indicates understanding: Yes      COUNSELING:  Reviewed preventive health counseling, as reflected in patient instructions       Regular exercise       Healthy diet/nutrition        She reports that she has never smoked. She does not have any smokeless tobacco history on file.          Nel Austin MD  [facility]

## 2024-01-11 ENCOUNTER — MYC MEDICAL ADVICE (OUTPATIENT)
Dept: INTERNAL MEDICINE | Facility: CLINIC | Age: 54
End: 2024-01-11
Payer: COMMERCIAL

## 2024-01-11 DIAGNOSIS — F41.9 ANXIETY: ICD-10-CM

## 2024-01-11 DIAGNOSIS — R42 VERTIGO: Primary | ICD-10-CM

## 2024-01-11 NOTE — TELEPHONE ENCOUNTER
Please see patient's mychart message below.    Has a brain MRI scheduled 1/15/24 - diagnosis is vertigo.  She is also asking for an order for an MRA - see her reasoning.    Please advise, thanks.  Routed to covering providers.

## 2024-01-13 RX ORDER — LORAZEPAM 1 MG/1
1 TABLET ORAL
Qty: 4 TABLET | Refills: 0 | Status: SHIPPED | OUTPATIENT
Start: 2024-01-13 | End: 2024-09-09

## 2024-01-15 ENCOUNTER — ANCILLARY PROCEDURE (OUTPATIENT)
Dept: MRI IMAGING | Facility: CLINIC | Age: 54
End: 2024-01-15
Attending: INTERNAL MEDICINE
Payer: COMMERCIAL

## 2024-01-15 DIAGNOSIS — R42 VERTIGO: ICD-10-CM

## 2024-01-15 PROCEDURE — 70553 MRI BRAIN STEM W/O & W/DYE: CPT | Mod: GC | Performed by: RADIOLOGY

## 2024-01-15 PROCEDURE — A9585 GADOBUTROL INJECTION: HCPCS | Performed by: RADIOLOGY

## 2024-01-15 RX ORDER — GADOBUTROL 604.72 MG/ML
10 INJECTION INTRAVENOUS ONCE
Status: COMPLETED | OUTPATIENT
Start: 2024-01-15 | End: 2024-01-15

## 2024-01-15 RX ADMIN — GADOBUTROL 8 ML: 604.72 INJECTION INTRAVENOUS at 18:45

## 2024-01-26 ENCOUNTER — ANCILLARY PROCEDURE (OUTPATIENT)
Dept: MRI IMAGING | Facility: CLINIC | Age: 54
End: 2024-01-26
Attending: INTERNAL MEDICINE
Payer: COMMERCIAL

## 2024-01-26 PROCEDURE — 70544 MR ANGIOGRAPHY HEAD W/O DYE: CPT | Mod: GC | Performed by: RADIOLOGY

## 2024-05-20 ENCOUNTER — PATIENT OUTREACH (OUTPATIENT)
Dept: INTERNAL MEDICINE | Facility: CLINIC | Age: 54
End: 2024-05-20
Payer: COMMERCIAL

## 2024-05-20 NOTE — PROGRESS NOTES
Virtual Visit Details    Type of service:  Video Visit     Originating Location (pt. Location): Home    Distant Location (provider location):  Off-site  Platform used for Video Visit: Skinny    Outcome for 05/20/24 8:07 AM: [employer] message sent  Melody Capone MA  Outcome for 05/30/24 1:50 PM: Left Voicemail   Melody Capone MA  Outcome for 05/30/24 3:01 PM: Patient is not checking blood sugars  Melody Capone MA    This 53 year old woman was seen because she is concerned that she may have diabetes and kidney disease.  Her A1c has gone up a bit and she had her kidney test checked last year.  They showed an increase in creatinine and GFR which made her concerned.    She tells me that she has experienced fluctuations in her blood sugar for many years.  She had problems with what she calls low blood sugars when she was a child that were relieved by eating.  When she became pregnant 17 years ago she had gestational diabetes.  She was treated with insulin.  After the pregnancy her blood sugars normalized.  She has had a couple of oral glucose tolerance test done during her life that were abnormal.  However, no one acted on them by giving her treatment for diabetes.  She is very active.  She kicked boxes at a club several times a week and likes to walk.  For some period of time last year she was walking 4 more miles a day.  She is not walking that much now.  The club she goes to has a very strong focus on nutrition and she feels she understands what she needs to eat.  She usually eats breakfast but if she does not she will eat breakfast at noon time.  She usually eats lunch and dinner.  She probably gets 1-3 servings of fruits and vegetables each day.  She cooks at home.  Her highest nonpregnant weight was 177 but she has now come down to 168.  Not too long ago she was down to as low as 160.  She would like to get down to a weight of 140.  A few months ago she saw her primary care doctor and she was put on  metformin.  She is now taking 500 mg twice a day.  She did have some GI symptoms initially but does not now.    She has had urinary incontinence for many years.  She always wears a pad.  She is concerned that the smell is very strong.  She is worried this is a sign of kidney disease.  She has no dysuria or hematuria.    Over the winter she was diagnosed with a vestibular migraine.  She still undergoing evaluation for that.  She also frequently is found to have hypertension when she seen in doctor's offices.  She has not been measuring her blood pressure at home.    Today she denies chest pain or shortness of breath.  She has good muscle strength but has numbness in her hands and feet when she wakes up in the morning.  They are relieved by shaking the limbs.  Her last menstrual period was about a year ago.    Patient Active Problem List   Diagnosis    CARDIOVASCULAR SCREENING; LDL GOAL LESS THAN 130    Prediabetes    Elevated blood pressure reading without diagnosis of hypertension      Current Outpatient Medications   Medication Sig Dispense Refill    albuterol (PROAIR HFA/PROVENTIL HFA/VENTOLIN HFA) 108 (90 Base) MCG/ACT inhaler Inhale 2 puffs into the lungs every 6 hours 18 g 1    LORazepam (ATIVAN) 1 MG tablet Take 1 tablet (1 mg) by mouth once as needed for anxiety (prior to imaging- may repeat x 1) 4 tablet 0    metFORMIN (GLUCOPHAGE) 500 MG tablet Take 1 tablet (500 mg) by mouth 2 times daily (with meals) 180 tablet 3     No current facility-administered medications for this visit.     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  She is never smoked cigarettes.  She drinks socially.    Her father has type 2 diabetes but has never been on insulin.  He also has had at least 2 cardiac procedures like having stents put in.  She thinks her paternal grandparents also both had diabetes.  Her mother  of cancer.  Her grandfather had heart disease.  She knows high  "cholesterol runs in her family           98  F (36.7  C)  as of 12/18/2023       Pulse: 77  as of 12/18/2023      BP: 140/90 Abnormal   as of 12/18/2023      Resp: 16  as of 10/26/2023      SpO2: 97%  as of 12/18/2023      Body Mass Index: None      Systolic (Patient Repo...: Not taken      Diastolic (Patient Rep...: Not taken      Height: 1.588 m (5' 2.5\")  as of 10/26/2023      Weight: 78.9 kg (174 lb)  as of 12/18/2023      Body Surface Area: 1.87 m   1.588 m (5' 2.5\")  as of 10/26/2023  78.9 kg (174 lb)  as of 12/18/2023        BMI 31.8  On exam she is in no acute distress.     Latest Reference Range & Units Most Recent   Sodium 135 - 145 mmol/L 139  10/26/23 11:08   Potassium 3.4 - 5.3 mmol/L 4.0  10/28/22 09:51   Potassium 3.4 - 5.3 mmol/L 4.3  10/26/23 11:08   Chloride 94 - 109 mmol/L 105  10/28/22 09:51   Chloride 98 - 107 mmol/L 102  10/26/23 11:08   Carbon Dioxide 20 - 32 mmol/L 27  10/28/22 09:51   Carbon Dioxide (CO2) 22 - 29 mmol/L 23  10/26/23 11:08   Urea Nitrogen 7 - 30 mg/dL 15  10/28/22 09:51   Urea Nitrogen 6.0 - 20.0 mg/dL 15.1  10/26/23 11:08   Creatinine 0.51 - 0.95 mg/dL 0.91  10/26/23 11:08   GFR Estimate >60 mL/min/1.73m2 75  10/26/23 11:08   GFR Estimate If Black >60 mL/min/1.73_m2 81  3/23/20 13:00   Calcium 8.6 - 10.0 mg/dL 9.6  10/26/23 11:08   Anion Gap 7 - 15 mmol/L 14  10/26/23 11:08   Albumin 3.5 - 5.2 g/dL 4.5  10/26/23 11:08   Protein Total 6.4 - 8.3 g/dL 7.4  10/26/23 11:08   Alkaline Phosphatase 35 - 104 U/L 52  10/26/23 11:08   ALT 0 - 50 U/L 19  10/26/23 11:08   AST 0 - 45 U/L 21  10/26/23 11:08   Albumin Urine mg/g Cr 0.00 - 25.00 mg/g Cr 22.86  10/28/22 09:51   Albumin Urine mg/L mg/L 32  10/28/22 09:51   Bilirubin Total <=1.2 mg/dL 0.4  10/26/23 11:08   Cholesterol <200 mg/dL 251 (H)  10/26/23 11:08   Creatinine Urine mg/dL 140  10/28/22 09:51   Patient Fasting?  Yes  10/28/22 09:51   Glucose 70 - 99 mg/dL 105 (H)  10/26/23 11:08   HDL Cholesterol >=50 mg/dL 89  10/26/23 " 11:08   Hemoglobin A1C 0.0 - 5.6 % 5.8 (H)  10/26/23 11:08   LDL Cholesterol Calculated <=100 mg/dL 146 (H)  10/26/23 11:08   Non HDL Cholesterol <130 mg/dL 162 (H)  10/26/23 11:08   Triglycerides <150 mg/dL 79  10/26/23 11:08   Troponin I ES 0.000 - 0.045 ug/L <0.015  3/23/20 15:55   TSH 0.40 - 4.00 mU/L 2.00  10/28/22 09:51   Glucose 70 - 99 mg/dL 110 (H)  10/28/22 09:51   WBC 4.0 - 11.0 10e9/L 7.8  3/23/20 13:00   Hemoglobin 11.7 - 15.7 g/dL 13.7  3/23/20 13:00   Hematocrit 35.0 - 47.0 % 40.7  3/23/20 13:00   Platelet Count 150 - 450 10e9/L 404  3/23/20 13:00   RBC Count 3.8 - 5.2 10e12/L 4.13  3/23/20 13:00   MCV 78 - 100 fl 99  3/23/20 13:00   MCH 26.5 - 33.0 pg 33.2 (H)  3/23/20 13:00   MCHC 31.5 - 36.5 g/dL 33.7  3/23/20 13:00   RDW 10.0 - 15.0 % 11.4  3/23/20 13:00   Diff Method  Automated Method  3/23/20 13:00   % Neutrophils % 49.1  3/23/20 13:00   % Lymphocytes % 43.1  3/23/20 13:00   % Monocytes % 6.0  3/23/20 13:00   % Eosinophils % 1.3  3/23/20 13:00   % Basophils % 0.4  3/23/20 13:00   % Immature Granulocytes % 0.1  3/23/20 13:00   Nucleated RBCs 0 /100 0  3/23/20 13:00   Absolute Neutrophil 1.6 - 8.3 10e9/L 3.8  3/23/20 13:00   Absolute Lymphocytes 0.8 - 5.3 10e9/L 3.4  3/23/20 13:00   Absolute Monocytes 0.0 - 1.3 10e9/L 0.5  3/23/20 13:00   Absolute Eosinophils 0.0 - 0.7 10e9/L 0.1  3/23/20 13:00   Absolute Basophils 0.0 - 0.2 10e9/L 0.0  3/23/20 13:00   Abs Immature Granulocytes 0 - 0.4 10e9/L 0.0  3/23/20 13:00   Absolute Nucleated RBC  0.0  3/23/20 13:00       Assessment and plan:    This 53-year-old woman is concerned about her prediabetes and how to best manage it.  She is also concerned about her kidney disease.  I told her that fluctuations in creatinine and GFR can occur as a result of changes in hydration.  Based on the data listed above, I am not concerned about her kidney disease.  Nonetheless, I will repeat her electrolytes.    She has a history of elevated glucose values on an OGTT so  it is possible her A1c is not completely accurate.  I note that she does not have completely normal RBC parameters.  I will check a reticulocyte count to be sure that her A1c is not artificially lowered.    Given her history of gestational diabetes, family history of diabetes, and an A1c of 5.8 she clearly is at risk for developing diabetes in the future.  I reviewed the results of the diabetes prevention study and recommended she continue to exercise at least 150 minutes a week and reducing the calories she eats.  We discussed a referral to a dietitian but for now she will hold off on that.  I told her that if she lost 5 to 10% of her body weight it would have a large positive effect on her health.   If diet and exercise are unable to accomplish this, we might consider using a GLP-1 analog.  Her BMI is above 30 and she has a history of hypertension.  This should qualify her for an insurance standpoint of getting the medication.  We talked about the fact that she would likely need to remain on the GLP-1 analog for life to maintain her weight loss.    She has been taking metformin so I will repeat her B12 level to be sure it is normal.    She has urinary incontinence.  I will check her UA to be sure it is normal.  I will refer her to urology to discuss management of this problem.    If her laboratory data are normal, she can do follow-up with her primary care provider.  I have recommended that she check her blood pressure at home and bring that dated to see her primary care provider.  If she is consistently above 140 systolic, she should likely be put on medication.  I also looked at her cholesterol and note that it is a bit elevated.  If she is found to have hypertension, I would definitely start her on a lipid-lowering drug as well.  If not I might hold off for a bit longer.    I spent 46 minutes on the video visit with the patient (start time 5: 00 and end time 5: 4 6).  On the same day of visit I spent an  additional 18 minutes reviewing her chart, reviewing and interpreting her lab data, ordering tests, and doing documentation.  The visit was done at my office away from clinic    Cary Arteaga MD

## 2024-05-20 NOTE — TELEPHONE ENCOUNTER
Patient Quality Outreach    Patient is due for the following:   Hypertension -  BP check  Breast Cancer Screening - Mammogram      Next Steps:   Schedule a office visit for as above.    Type of outreach:    Phone, left message for patient/parent to call back. and Sent [employer] message.      Questions for provider review:    None           Janelle De La Cruz LPN  Chart routed to none.

## 2024-05-28 NOTE — TELEPHONE ENCOUNTER
RECORDS RECEIVED FROM:    Appt Date: 6/3/2024    Pre-diabetes appt    NOTES (FOR ALL VISITS) STATUS DETAILS   OFFICE NOTES from referring provider  Self Referred    ED NOTES [facility] 11/17/2023- Hypertension, unspecified type    MEDICATION LIST Internal    IMAGING      MRI (BRAIN) Internal MRA Brain 1/26/2024, MRI Brain 1/15/2024   LABS     DIABETES: HBGA1C, CREATININE, FASTING LIPIDS, MICROALBUMIN URINE, POTASSIUM, TSH, T4    THYROID: TSH, T4, CBC, THYRODLONULIN, TOTAL T3, FREE T4, CALCITONIN, CEA Internal

## 2024-05-30 ENCOUNTER — TELEPHONE (OUTPATIENT)
Dept: ENDOCRINOLOGY | Facility: CLINIC | Age: 54
End: 2024-05-30
Payer: COMMERCIAL

## 2024-05-30 NOTE — TELEPHONE ENCOUNTER
Called patient and left voicemail. Patient has appointment on 6/3/24. Need to collect 14 days of blood sugar readings if patient is using meter, or if patient is using CGM, need to get patients device uploaded to retrieve report for provider to review.  Melody Capone MA

## 2024-06-03 ENCOUNTER — PRE VISIT (OUTPATIENT)
Dept: ENDOCRINOLOGY | Facility: CLINIC | Age: 54
End: 2024-06-03

## 2024-06-03 ENCOUNTER — VIRTUAL VISIT (OUTPATIENT)
Dept: ENDOCRINOLOGY | Facility: CLINIC | Age: 54
End: 2024-06-03
Payer: COMMERCIAL

## 2024-06-03 DIAGNOSIS — N39.3 STRESS INCONTINENCE OF URINE: ICD-10-CM

## 2024-06-03 DIAGNOSIS — R73.03 PREDIABETES: Primary | ICD-10-CM

## 2024-06-03 PROCEDURE — 99205 OFFICE O/P NEW HI 60 MIN: CPT | Mod: 95 | Performed by: INTERNAL MEDICINE

## 2024-06-03 NOTE — PATIENT INSTRUCTIONS
Have your lab tests done.  If no one calls youlPlease contact us to schedule at any of our [place] lab locations  Call [phone] ([phone]), select option [phone]

## 2024-06-03 NOTE — LETTER
6/3/2024       RE: Ninfa Simons  [address] [facility] [address]     Dear Colleague,    Thank you for referring your patient, Ninfa Simons, to the [facility] [place] [facility]. Please see a copy of my visit note below.    Virtual Visit Details    Type of service:  Video Visit     Originating Location (pt. Location): Home    Distant Location (provider location):  Off-site  Platform used for Video Visit: [employer]    Outcome for 05/20/24 8:07 AM: [employer] message sent  Melody Capone MA  Outcome for 05/30/24 1:50 PM: Left Voicemail   Melody Capone MA  Outcome for 05/30/24 3:01 PM: Patient is not checking blood sugars  Melody Capone MA    This 53 year old woman was seen because she is concerned that she may have diabetes and kidney disease.  Her A1c has gone up a bit and she had her kidney test checked last year.  They showed an increase in creatinine and GFR which made her concerned.    She tells me that she has experienced fluctuations in her blood sugar for many years.  She had problems with what she calls low blood sugars when she was a child that were relieved by eating.  When she became pregnant 17 years ago she had gestational diabetes.  She was treated with insulin.  After the pregnancy her blood sugars normalized.  She has had a couple of oral glucose tolerance test done during her life that were abnormal.  However, no one acted on them by giving her treatment for diabetes.  She is very active.  She kicked boxes at a club several times a week and likes to walk.  For some period of time last year she was walking 4 more miles a day.  She is not walking that much now.  The club she goes to has a very strong focus on nutrition and she feels she understands what she needs to eat.  She usually eats breakfast but if she does not she will eat breakfast at noon time.  She usually eats lunch and dinner.  She  probably gets 1-3 servings of fruits and vegetables each day.  She cooks at home.  Her highest nonpregnant weight was 177 but she has now come down to 168.  Not too long ago she was down to as low as 160.  She would like to get down to a weight of 140.  A few months ago she saw her primary care doctor and she was put on metformin.  She is now taking 500 mg twice a day.  She did have some GI symptoms initially but does not now.    She has had urinary incontinence for many years.  She always wears a pad.  She is concerned that the smell is very strong.  She is worried this is a sign of kidney disease.  She has no dysuria or hematuria.    Over the winter she was diagnosed with a vestibular migraine.  She still undergoing evaluation for that.  She also frequently is found to have hypertension when she seen in doctor's offices.  She has not been measuring her blood pressure at home.    Today she denies chest pain or shortness of breath.  She has good muscle strength but has numbness in her hands and feet when she wakes up in the morning.  They are relieved by shaking the limbs.  Her last menstrual period was about a year ago.    Patient Active Problem List   Diagnosis    CARDIOVASCULAR SCREENING; LDL GOAL LESS THAN 130    Prediabetes    Elevated blood pressure reading without diagnosis of hypertension      Current Outpatient Medications   Medication Sig Dispense Refill    albuterol (PROAIR HFA/PROVENTIL HFA/VENTOLIN HFA) 108 (90 Base) MCG/ACT inhaler Inhale 2 puffs into the lungs every 6 hours 18 g 1    LORazepam (ATIVAN) 1 MG tablet Take 1 tablet (1 mg) by mouth once as needed for anxiety (prior to imaging- may repeat x 1) 4 tablet 0    metFORMIN (GLUCOPHAGE) 500 MG tablet Take 1 tablet (500 mg) by mouth 2 times daily (with meals) 180 tablet 3     No current facility-administered medications for this visit.     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  She is never smoked  "cigarettes.  She drinks socially.    Her father has type 2 diabetes but has never been on insulin.  She thinks her paternal grandparents also both had diabetes.  Her mother  of cancer.  Her grandfather had heart disease.  She knows high cholesterol runs in her family           98  F (36.7  C)  as of 2023       Pulse: 77  as of 2023      BP: 140/90 Abnormal   as of 2023      Resp: 16  as of 10/26/2023      SpO2: 97%  as of 2023      Body Mass Index: None      Systolic (Patient Repo...: Not taken      Diastolic (Patient Rep...: Not taken      Height: 1.588 m (5' 2.5\")  as of 10/26/2023      Weight: 78.9 kg (174 lb)  as of 2023      Body Surface Area: 1.87 m   1.588 m (5' 2.5\")  as of 10/26/2023  78.9 kg (174 lb)  as of 2023        BMI 31.8  On exam she is in no acute distress.     Latest Reference Range & Units Most Recent   Sodium 135 - 145 mmol/L 139  10/26/23 11:08   Potassium 3.4 - 5.3 mmol/L 4.0  10/28/22 09:51   Potassium 3.4 - 5.3 mmol/L 4.3  10/26/23 11:08   Chloride 94 - 109 mmol/L 105  10/28/22 09:51   Chloride 98 - 107 mmol/L 102  10/26/23 11:08   Carbon Dioxide 20 - 32 mmol/L 27  10/28/22 09:51   Carbon Dioxide (CO2) 22 - 29 mmol/L 23  10/26/23 11:08   Urea Nitrogen 7 - 30 mg/dL 15  10/28/22 09:51   Urea Nitrogen 6.0 - 20.0 mg/dL 15.1  10/26/23 11:08   Creatinine 0.51 - 0.95 mg/dL 0.91  10/26/23 11:08   GFR Estimate >60 mL/min/1.73m2 75  10/26/23 11:08   GFR Estimate If Black >60 mL/min/1.73_m2 81  3/23/20 13:00   Calcium 8.6 - 10.0 mg/dL 9.6  10/26/23 11:08   Anion Gap 7 - 15 mmol/L 14  10/26/23 11:08   Albumin 3.5 - 5.2 g/dL 4.5  10/26/23 11:08   Protein Total 6.4 - 8.3 g/dL 7.4  10/26/23 11:08   Alkaline Phosphatase 35 - 104 U/L 52  10/26/23 11:08   ALT 0 - 50 U/L 19  10/26/23 11:08   AST 0 - 45 U/L 21  10/26/23 11:08   Albumin Urine mg/g Cr 0.00 - 25.00 mg/g Cr 22.86  10/28/22 09:51   Albumin Urine mg/L mg/L 32  10/28/22 09:51   Bilirubin Total <=1.2 mg/dL " 0.4  10/26/23 11:08   Cholesterol <200 mg/dL 251 (H)  10/26/23 11:08   Creatinine Urine mg/dL 140  10/28/22 09:51   Patient Fasting?  Yes  10/28/22 09:51   Glucose 70 - 99 mg/dL 105 (H)  10/26/23 11:08   HDL Cholesterol >=50 mg/dL 89  10/26/23 11:08   Hemoglobin A1C 0.0 - 5.6 % 5.8 (H)  10/26/23 11:08   LDL Cholesterol Calculated <=100 mg/dL 146 (H)  10/26/23 11:08   Non HDL Cholesterol <130 mg/dL 162 (H)  10/26/23 11:08   Triglycerides <150 mg/dL 79  10/26/23 11:08   Troponin I ES 0.000 - 0.045 ug/L <0.015  3/23/20 15:55   TSH 0.40 - 4.00 mU/L 2.00  10/28/22 09:51   Glucose 70 - 99 mg/dL 110 (H)  10/28/22 09:51   WBC 4.0 - 11.0 10e9/L 7.8  3/23/20 13:00   Hemoglobin 11.7 - 15.7 g/dL 13.7  3/23/20 13:00   Hematocrit 35.0 - 47.0 % 40.7  3/23/20 13:00   Platelet Count 150 - 450 10e9/L 404  3/23/20 13:00   RBC Count 3.8 - 5.2 10e12/L 4.13  3/23/20 13:00   MCV 78 - 100 fl 99  3/23/20 13:00   MCH 26.5 - 33.0 pg 33.2 (H)  3/23/20 13:00   MCHC 31.5 - 36.5 g/dL 33.7  3/23/20 13:00   RDW 10.0 - 15.0 % 11.4  3/23/20 13:00   Diff Method  Automated Method  3/23/20 13:00   % Neutrophils % 49.1  3/23/20 13:00   % Lymphocytes % 43.1  3/23/20 13:00   % Monocytes % 6.0  3/23/20 13:00   % Eosinophils % 1.3  3/23/20 13:00   % Basophils % 0.4  3/23/20 13:00   % Immature Granulocytes % 0.1  3/23/20 13:00   Nucleated RBCs 0 /100 0  3/23/20 13:00   Absolute Neutrophil 1.6 - 8.3 10e9/L 3.8  3/23/20 13:00   Absolute Lymphocytes 0.8 - 5.3 10e9/L 3.4  3/23/20 13:00   Absolute Monocytes 0.0 - 1.3 10e9/L 0.5  3/23/20 13:00   Absolute Eosinophils 0.0 - 0.7 10e9/L 0.1  3/23/20 13:00   Absolute Basophils 0.0 - 0.2 10e9/L 0.0  3/23/20 13:00   Abs Immature Granulocytes 0 - 0.4 10e9/L 0.0  3/23/20 13:00   Absolute Nucleated RBC  0.0  3/23/20 13:00     Assessment and plan:    This 53-year-old woman is concerned about her prediabetes and how to best manage it.  She is also concerned about her kidney disease.  I told her that fluctuations in creatinine  and GFR can occur as a result of changes in hydration.  Based on the data listed above, I am not concerned about her kidney disease.  Nonetheless, I will repeat her electrolytes.    She has a history of elevated glucose values on an OGTT so it is possible her A1c is not completely accurate.  I note that she does not have completely normal RBC parameters.  I will check a reticulocyte count to be sure that her A1c is not artificially lowered.    Given her history of gestational diabetes, family history of diabetes, and an A1c of 5.8 she clearly is at risk for developing diabetes in the future.  I reviewed the results of the diabetes prevention study and recommended she continue to exercise at least 150 minutes a week and reducing the calories she eats.  We discussed a referral to a dietitian but for now she will hold off on that.  I told her that if she lost 5 to 10% of her body weight it would have a large positive effect on her health.   If diet and exercise are unable to accomplish this, we might consider using a GLP-1 analog.  Her BMI is above 30 and she has a history of hypertension.  This should qualify her for an insurance standpoint of getting the medication.  We talked about the fact that she would likely need to remain on the GLP-1 analog for life to maintain her weight loss.    She has been taking metformin so I will repeat her B12 level to be sure it is normal.    She has urinary incontinence.  I will check her UA to be sure it is normal.  I will refer her to urology to discuss management of this problem.    If her laboratory data are normal, she can do follow-up with her primary care provider.  I have recommended that she check her blood pressure at home and bring that dated to see her primary care provider.  If she is consistently above 140 systolic, she should likely be put on medication.  I also looked at her cholesterol and note that it is a bit elevated.  If she is found to have hypertension, I would  definitely start her on a lipid-lowering drug as well.  If not I might hold off for a bit longer.    I spent 46 minutes on the video visit with the patient (start time 5: 00 and end time 5: 4 6).  On the same day of visit I spent an additional 18 minutes reviewing her chart, reviewing and interpreting her lab data, ordering tests, and doing documentation.  The visit was done at my office away from clinic  Cary Arteaga MD

## 2024-06-03 NOTE — NURSING NOTE
Is the patient currently in the state of [place]? YES    Visit mode:VIDEO    If the visit is dropped, the patient can be reconnected by: VIDEO VISIT: Text to cell phone:   Telephone Information:   Mobile [phone]    and VIDEO VISIT: Send to e-mail at: [email or web address]    Will anyone else be joining the visit? NO  (If patient encounters technical issues they should call [phone]    How would you like to obtain your AVS? MyChart    Are changes needed to the allergy or medication list? No    Are refills needed on medications prescribed by this physician? NO    Reason for visit: Consult    Anita SORENSON

## 2024-06-11 ENCOUNTER — LAB (OUTPATIENT)
Dept: LAB | Facility: CLINIC | Age: 54
End: 2024-06-11
Payer: COMMERCIAL

## 2024-06-11 DIAGNOSIS — R73.03 PREDIABETES: ICD-10-CM

## 2024-06-11 LAB
ALBUMIN UR-MCNC: NEGATIVE MG/DL
ANION GAP SERPL CALCULATED.3IONS-SCNC: 9 MMOL/L (ref 7–15)
APPEARANCE UR: CLEAR
BACTERIA #/AREA URNS HPF: ABNORMAL /HPF
BILIRUB UR QL STRIP: NEGATIVE
CHLORIDE SERPL-SCNC: 103 MMOL/L (ref 98–107)
CHOLEST SERPL-MCNC: 222 MG/DL
COLOR UR AUTO: ABNORMAL
CREAT SERPL-MCNC: 0.99 MG/DL (ref 0.51–0.95)
DEPRECATED HCO3 PLAS-SCNC: 26 MMOL/L (ref 22–29)
EGFRCR SERPLBLD CKD-EPI 2021: 68 ML/MIN/1.73M2
FASTING STATUS PATIENT QL REPORTED: YES
GLUCOSE UR STRIP-MCNC: NEGATIVE MG/DL
HBA1C MFR BLD: 6 % (ref 0–5.6)
HDLC SERPL-MCNC: 93 MG/DL
HGB UR QL STRIP: ABNORMAL
KETONES UR STRIP-MCNC: NEGATIVE MG/DL
LDLC SERPL CALC-MCNC: 117 MG/DL
LEUKOCYTE ESTERASE UR QL STRIP: ABNORMAL
MUCOUS THREADS #/AREA URNS LPF: PRESENT /LPF
NITRATE UR QL: NEGATIVE
NONHDLC SERPL-MCNC: 129 MG/DL
PH UR STRIP: 5 [PH] (ref 5–7)
POTASSIUM SERPL-SCNC: 4.4 MMOL/L (ref 3.4–5.3)
RBC #/AREA URNS AUTO: ABNORMAL /HPF
RETICS # AUTO: 0.06 10E6/UL
RETICS/RBC NFR AUTO: 1.5 %
SODIUM SERPL-SCNC: 138 MMOL/L (ref 135–145)
SP GR UR STRIP: 1.02 (ref 1–1.03)
SQUAMOUS #/AREA URNS AUTO: ABNORMAL /LPF
TRIGL SERPL-MCNC: 61 MG/DL
UROBILINOGEN UR STRIP-MCNC: NORMAL MG/DL
VIT B12 SERPL-MCNC: 2066 PG/ML (ref 232–1245)
WBC #/AREA URNS AUTO: ABNORMAL /HPF

## 2024-06-11 PROCEDURE — 36415 COLL VENOUS BLD VENIPUNCTURE: CPT

## 2024-06-11 PROCEDURE — 80051 ELECTROLYTE PANEL: CPT

## 2024-06-11 PROCEDURE — 82607 VITAMIN B-12: CPT

## 2024-06-11 PROCEDURE — 85045 AUTOMATED RETICULOCYTE COUNT: CPT

## 2024-06-11 PROCEDURE — 80061 LIPID PANEL: CPT

## 2024-06-11 PROCEDURE — 82565 ASSAY OF CREATININE: CPT

## 2024-06-11 PROCEDURE — 83036 HEMOGLOBIN GLYCOSYLATED A1C: CPT

## 2024-06-11 PROCEDURE — 81001 URINALYSIS AUTO W/SCOPE: CPT

## 2024-06-14 ENCOUNTER — TELEPHONE (OUTPATIENT)
Dept: ENDOCRINOLOGY | Facility: CLINIC | Age: 54
End: 2024-06-14
Payer: COMMERCIAL

## 2024-06-14 NOTE — TELEPHONE ENCOUNTER
Left Voicemail (1st Attempt) and Sent Mychart (1st Attempt) for the patient to call back and schedule the following:    Appointment type: return diabetes   Provider: Jenni   Return date: 6/26 at 8am virtual add one   Specialty phone number: [phone]  Additional appointment(s) needed:   Additonal Notes: 6/26 IS NOT A NORMALLY SCHEDULED DAY FOR DR. ARTEAGA BUT IT HAS BEEN OKAY'D TO SCHEDULED BY PROVIDER       Cary Arteaga MD  Clinic [id number] hours ago (3:56 PM)     ES  Please schedule her for a virtual visit at 8 am wed 6/26      THIS WILL NEED TO BE SCHEDULED MANUALLY     Rosalia Bethea on 6/14/2024 at 12:26 PM

## 2024-06-20 ENCOUNTER — OFFICE VISIT (OUTPATIENT)
Dept: FAMILY MEDICINE | Facility: CLINIC | Age: 54
End: 2024-06-20
Payer: COMMERCIAL

## 2024-06-20 VITALS
OXYGEN SATURATION: 97 % | WEIGHT: 167.4 LBS | SYSTOLIC BLOOD PRESSURE: 120 MMHG | BODY MASS INDEX: 30.8 KG/M2 | RESPIRATION RATE: 20 BRPM | HEART RATE: 79 BPM | HEIGHT: 62 IN | TEMPERATURE: 97.6 F | DIASTOLIC BLOOD PRESSURE: 88 MMHG

## 2024-06-20 DIAGNOSIS — S61.211S LACERATION OF LEFT INDEX FINGER WITHOUT FOREIGN BODY WITHOUT DAMAGE TO NAIL, SEQUELA: ICD-10-CM

## 2024-06-20 DIAGNOSIS — R73.03 PREDIABETES: Primary | ICD-10-CM

## 2024-06-20 DIAGNOSIS — R35.0 URINARY FREQUENCY: ICD-10-CM

## 2024-06-20 LAB
ALBUMIN UR-MCNC: NEGATIVE MG/DL
APPEARANCE UR: CLEAR
BILIRUB UR QL STRIP: NEGATIVE
COLOR UR AUTO: YELLOW
GLUCOSE UR STRIP-MCNC: NEGATIVE MG/DL
HGB UR QL STRIP: ABNORMAL
KETONES UR STRIP-MCNC: NEGATIVE MG/DL
LEUKOCYTE ESTERASE UR QL STRIP: NEGATIVE
NITRATE UR QL: NEGATIVE
PH UR STRIP: 5.5 [PH] (ref 5–7)
RBC #/AREA URNS AUTO: NORMAL /HPF
SP GR UR STRIP: 1.02 (ref 1–1.03)
UROBILINOGEN UR STRIP-ACNC: 0.2 E.U./DL
WBC #/AREA URNS AUTO: NORMAL /HPF

## 2024-06-20 PROCEDURE — 81001 URINALYSIS AUTO W/SCOPE: CPT | Performed by: PHYSICIAN ASSISTANT

## 2024-06-20 PROCEDURE — 99214 OFFICE O/P EST MOD 30 MIN: CPT | Performed by: PHYSICIAN ASSISTANT

## 2024-06-20 ASSESSMENT — ASTHMA QUESTIONNAIRES
QUESTION_2 LAST FOUR WEEKS HOW OFTEN HAVE YOU HAD SHORTNESS OF BREATH: NOT AT ALL
ACT_TOTALSCORE: 24
ACT_TOTALSCORE: 24
QUESTION_1 LAST FOUR WEEKS HOW MUCH OF THE TIME DID YOUR ASTHMA KEEP YOU FROM GETTING AS MUCH DONE AT WORK, SCHOOL OR AT HOME: NONE OF THE TIME
QUESTION_4 LAST FOUR WEEKS HOW OFTEN HAVE YOU USED YOUR RESCUE INHALER OR NEBULIZER MEDICATION (SUCH AS ALBUTEROL): NOT AT ALL
QUESTION_5 LAST FOUR WEEKS HOW WOULD YOU RATE YOUR ASTHMA CONTROL: WELL CONTROLLED
QUESTION_3 LAST FOUR WEEKS HOW OFTEN DID YOUR ASTHMA SYMPTOMS (WHEEZING, COUGHING, SHORTNESS OF BREATH, CHEST TIGHTNESS OR PAIN) WAKE YOU UP AT NIGHT OR EARLIER THAN USUAL IN THE MORNING: NOT AT ALL

## 2024-06-20 NOTE — PATIENT INSTRUCTIONS
Your lab results are reassuring.  Recommendations:    Urinalysis: Your sample was likely contaminated and does not show signs of urinary tract infection.  We will repeat this today.  Results will be available in [employer].  I will comment on results once I have a chance to review them.    Creatinine and GFR: This has remained stable over the last 4 years.  You can monitor this with your primary care provider, but I do not have concerns for kidney disease based on the current results.  Keep in mind that these numbers are affected by muscle mass and hydration over the last 3 days.    Vitamin B12: Your vitamin B12 level was a bit high likely from taking vitamin B12 supplements.  You can take your supplement every other day instead of daily and this should bring it back to the normal level.    Cholesterol: Your cholesterol is improving.  Please continue to work on weight management, diet, exercise.    A1c: Your A1c is in the prediabetes range.  Maintaining a normal BMI/weight as well as healthy diet and exercise will help keep this from worsening.    Finger: Your finger tendons are intact and the nerve dysfunction should continue to improve.  To help improve range of motion I have placed a referral to occupational therapy.  Scheduling will call you to set up your appointments.    Please continue with primary care follow-up as planned.

## 2024-06-20 NOTE — PROGRESS NOTES
Assessment & Plan   Patient is a 53-year-old female who presents to clinic for review of labs completed with endocrinology as well as follow-up regarding laceration to left index finger.  Vital signs normal.    Urinary frequency  Prediabetes  Reviewed all labs ordered by endocrinology.  A1c shows prediabetes.  Creatinine very mildly elevated, but near patient's baseline for the last 4 years.  GFR WNL.  Lipid panel mildly elevated, recommended lifestyle modifications.  Electrolytes WNL.  UA showed squamous epithelial cells, few RBCs and WBCs, bacteria, mucus.  Likely contamination.  Shared decision making completed with patient I will repeat today to ensure no underlying UTI.  Discussed the importance of clean-catch.  B12 level elevated likely related to supplementation.  Recommended decreasing supplementation to every other day.  Recheck normal.  - UA Macroscopic with reflex to Microscopic and Culture - Lab Collect; Future  - UA Macroscopic with reflex to Microscopic and Culture - Lab Collect  - UA Microscopic with Reflex to Culture        Laceration of left index finger without foreign body without damage to nail, sequela  History of laceration to left index finger at palmar aspect 1 month ago.  Sutures have since been removed and laceration appears well-healed.  Patient has slightly decreased range of motion from mild swelling and skin thickening.  No sign of cellulitis.  Recommend occupational therapy and referral placed.  - Occupational Therapy  Referral; Future      35 minutes spent by me on the date of the encounter doing chart review, history and exam, documentation and further activities per the note    See Patient Instructions    Tima Ocasio is a 53 year old, presenting for the following health issues:  Results (/)      6/20/2024     7:55 AM   Additional Questions   Roomed by MP         6/20/2024     7:55 AM   Patient Reported Additional Medications   Patient reports taking the following  "new medications valium     History of Present Illness       Reason for visit:  Follow up from endocrinology need urine tests reordered due to abnormal and endo thought likely contaminated    She eats 2-3 servings of fruits and vegetables daily.She consumes 0 sweetened beverage(s) daily.She exercises with enough effort to increase her heart rate 20 to 29 minutes per day.  She exercises with enough effort to increase her heart rate 4 days per week.   She is taking medications regularly.       Patient is here to follow up from endocrinology. Would like to discuss lab results and better understand any lab abnormalities.    Patient notes cut of left index finger one month ago. She had around 16 stitches. She was supposed to be seen for follow up. Stitches were removed a few weeks ago. Finger is numb at the tip, but still sore. Finger feels stiff.     Per chart review, patient evaluated by endocrinology 6/3/2024 for prediabetes and stress incontinence of urine.    Review of Systems   Constitutional:  Negative for fever.   Respiratory:  Negative for cough and shortness of breath.    Cardiovascular:  Negative for chest pain.   Gastrointestinal:  Negative for abdominal pain, nausea and vomiting.   Skin:  Positive for wound.   Neurological:  Positive for numbness. Negative for weakness.           Objective    /88   Pulse 79   Temp 97.6  F (36.4  C) (Temporal)   Resp 20   Ht 1.585 m (5' 2.4\")   Wt 75.9 kg (167 lb 6.4 oz)   LMP 08/01/2023 (Approximate)   SpO2 97%   BMI 30.22 kg/m    Body mass index is 30.22 kg/m .  Physical Exam  Vitals and nursing note reviewed.   Constitutional:       General: She is not in acute distress.     Appearance: Normal appearance.   HENT:      Head: Normocephalic and atraumatic.   Eyes:      Extraocular Movements: Extraocular movements intact.      Pupils: Pupils are equal, round, and reactive to light.   Cardiovascular:      Rate and Rhythm: Normal rate.   Pulmonary:      Effort: " Pulmonary effort is normal.   Musculoskeletal:         General: Normal range of motion.      Cervical back: Normal range of motion.   Skin:     General: Skin is warm and dry.      Comments: Left index finger: Healed laceration, approximately 2 cm, extending distally and proximally from DIP.  No increased warmth, but mild erythema.  Skin thickening.  Sensation intact, but decreased range of motion at DIP and PIP joints.   Neurological:      General: No focal deficit present.      Mental Status: She is alert.   Psychiatric:         Mood and Affect: Mood normal.         Behavior: Behavior normal.                    Signed Electronically by: Miguelina Allen PA-C

## 2024-06-24 ASSESSMENT — ENCOUNTER SYMPTOMS
NUMBNESS: 1
ABDOMINAL PAIN: 0
WOUND: 1
COUGH: 0
FEVER: 0
WEAKNESS: 0
VOMITING: 0
NAUSEA: 0
SHORTNESS OF BREATH: 0

## 2024-06-26 ENCOUNTER — VIRTUAL VISIT (OUTPATIENT)
Dept: ENDOCRINOLOGY | Facility: CLINIC | Age: 54
End: 2024-06-26
Payer: COMMERCIAL

## 2024-06-26 VITALS — WEIGHT: 167 LBS | BODY MASS INDEX: 30.15 KG/M2

## 2024-06-26 DIAGNOSIS — E66.09 CLASS 1 OBESITY DUE TO EXCESS CALORIES WITHOUT SERIOUS COMORBIDITY WITH BODY MASS INDEX (BMI) OF 31.0 TO 31.9 IN ADULT: ICD-10-CM

## 2024-06-26 DIAGNOSIS — E66.811 CLASS 1 OBESITY DUE TO EXCESS CALORIES WITHOUT SERIOUS COMORBIDITY WITH BODY MASS INDEX (BMI) OF 31.0 TO 31.9 IN ADULT: ICD-10-CM

## 2024-06-26 DIAGNOSIS — R73.03 PREDIABETES: Primary | ICD-10-CM

## 2024-06-26 PROCEDURE — 99213 OFFICE O/P EST LOW 20 MIN: CPT | Mod: 95 | Performed by: INTERNAL MEDICINE

## 2024-06-26 NOTE — LETTER
6/26/2024       RE: Ninfa Simons  [address] [facility] [address]     Dear Colleague,    Thank you for referring your patient, Ninfa Simons, to the [facility] [place] [facility]. Please see a copy of my visit note below.    Virtual Visit Details    Type of service:  Video Visit     Originating Location (pt. Location): Home    Distant Location (provider location):  Off-site  Platform used for Video Visit: Skinny    This 53-year-old woman was seen to discuss the results of lab tests obtained by me after our visit in May.  She came to talk to me about her risk for developing diabetes and kidney disease.  We had previously discussed the option of using a drug like Ozempic to help support weight loss.  She is a very active person who more than meets the recommended level of activity during the week but she has been unable to bring her weight down to a level she thinks will reduce her risk sufficiently.  She would like to think about using Ozempic and asked that I refer her to weight management for help with this.    She also asked about kidney health.  She is very concerned that the creatinine and GFR have varied a bit.  I told her that that likely is due to hydration status and that her kidney function appears to be normal.  She will follow-up with her primary care doctor about this in a few months.  Current Outpatient Medications   Medication Sig Dispense Refill    albuterol (PROAIR HFA/PROVENTIL HFA/VENTOLIN HFA) 108 (90 Base) MCG/ACT inhaler Inhale 2 puffs into the lungs every 6 hours 18 g 1    LORazepam (ATIVAN) 1 MG tablet Take 1 tablet (1 mg) by mouth once as needed for anxiety (prior to imaging- may repeat x 1) 4 tablet 0    metFORMIN (GLUCOPHAGE) 500 MG tablet Take 1 tablet (500 mg) by mouth 2 times daily (with meals) 180 tablet 3     No current facility-administered medications for this visit.             "  6/20/2024  0756 6/26/2024  0755 Most Recent Value     Temp: 97.6  F (36.4  C) -- 97.6  F (36.4  C)  as of 6/20/2024    Pulse: 79 -- 79  as of 6/20/2024    BP: 120/88 -- 120/88  as of 6/20/2024    Resp: 20 -- 20  as of 6/20/2024    SpO2: 97 % -- 97%  as of 6/20/2024    Body Mass Index:   30.15 kg/m  Abnormal   1.585 m (5' 2.4\")  as of 6/20/2024  75.8 kg (167 lb)  as of 6/26/2024      On exam she is in no acute distress.     Latest Reference Range & Units 06/11/24 08:35   Sodium 135 - 145 mmol/L 138   Potassium 3.4 - 5.3 mmol/L 4.4   Chloride 98 - 107 mmol/L 103   Carbon Dioxide (CO2) 22 - 29 mmol/L 26   Creatinine 0.51 - 0.95 mg/dL 0.99 (H)   GFR Estimate >60 mL/min/1.73m2 68   Anion Gap 7 - 15 mmol/L 9   Cholesterol <200 mg/dL 222 (H)   Patient Fasting?  Yes   HDL Cholesterol >=50 mg/dL 93   Hemoglobin A1C 0.0 - 5.6 % 6.0 (H)   LDL Cholesterol Calculated <=100 mg/dL 117 (H)   Non HDL Cholesterol <130 mg/dL 129   Triglycerides <150 mg/dL 61   Vitamin B12 232 - 1,245 pg/mL 2,066 (H)   % Retic % 1.5   Absolute Retic 10e6/uL 0.062   Color Urine Colorless, Straw, Light Yellow, Yellow  Light Yellow   Appearance Urine Clear  Clear   Glucose Urine Negative mg/dL Negative   Bilirubin Urine Negative  Negative   Ketones Urine Negative mg/dL Negative   Specific Gravity Urine 0.999 - 1.035  1.016   pH Urine 5.0 - 7.0  5.0   Protein Albumin Urine Negative mg/dL Negative   Urobilinogen mg/dL Normal, 2.0 mg/dL Normal   Nitrite Urine Negative  Negative   Blood Urine Negative  Small !   Leukocyte Esterase Urine Negative  Small !   WBC Urine 0-5 /HPF /HPF 5-10 !   RBC Urine 0-2 /HPF /HPF 2-5 !   Bacteria Urine None Seen /HPF Few !   Squamous Epithelial /LPF Urine None Seen /LPF Many !   Mucus Urine None Seen /LPF Present !       Assessment and plan:    This 53-year-old woman has a family history of type 2 diabetes and a personal history of gestational diabetes.  Her A1c is generally in the prediabetes level.  She would like to avoid " getting diabetes and has chosen to go on metformin.  She is very active but is unable to lose enough weight to get her BMI less than 27.  I will refer her to weight management clinic to discuss the options with them.  I also encouraged her to talk to her primary care doctor about her concerns.  All of her questions were answered and she was happy with the plan    I spent 12 minutes on the video visit with the patient today (start time 8: 00 end time 8: 1 2).  On the same day of visit I spent an additional 10 minutes reviewing her chart, reviewing and interpreting lab test, ordering a referral, and doing documentation.      Cary Arteaga MD

## 2024-06-26 NOTE — PROGRESS NOTES
"Virtual Visit Details    Type of service:  Video Visit     Originating Location (pt. Location): Home    Distant Location (provider location):  Off-site  Platform used for Video Visit: Skinny    This 53-year-old woman was seen to discuss the results of lab tests obtained by me after our visit in May.  She came to talk to me about her risk for developing diabetes and kidney disease.  We had previously discussed the option of using a drug like Ozempic to help support weight loss.  She is a very active person who more than meets the recommended level of activity during the week but she has been unable to bring her weight down to a level she thinks will reduce her risk sufficiently.  She would like to think about using Ozempic and asked that I refer her to weight management for help with this.    She also asked about kidney health.  She is very concerned that the creatinine and GFR have varied a bit.  I told her that that likely is due to hydration status and that her kidney function appears to be normal.  She will follow-up with her primary care doctor about this in a few months.  Current Outpatient Medications   Medication Sig Dispense Refill    albuterol (PROAIR HFA/PROVENTIL HFA/VENTOLIN HFA) 108 (90 Base) MCG/ACT inhaler Inhale 2 puffs into the lungs every 6 hours 18 g 1    LORazepam (ATIVAN) 1 MG tablet Take 1 tablet (1 mg) by mouth once as needed for anxiety (prior to imaging- may repeat x 1) 4 tablet 0    metFORMIN (GLUCOPHAGE) 500 MG tablet Take 1 tablet (500 mg) by mouth 2 times daily (with meals) 180 tablet 3     No current facility-administered medications for this visit.              6/20/2024  0756 6/26/2024  0755 Most Recent Value     Temp: 97.6  F (36.4  C) -- 97.6  F (36.4  C)  as of 6/20/2024    Pulse: 79 -- 79  as of 6/20/2024    BP: 120/88 -- 120/88  as of 6/20/2024    Resp: 20 -- 20  as of 6/20/2024    SpO2: 97 % -- 97%  as of 6/20/2024    Body Mass Index:   30.15 kg/m  Abnormal   1.585 m (5' 2.4\")  " as of 6/20/2024  75.8 kg (167 lb)  as of 6/26/2024      On exam she is in no acute distress.     Latest Reference Range & Units 06/11/24 08:35   Sodium 135 - 145 mmol/L 138   Potassium 3.4 - 5.3 mmol/L 4.4   Chloride 98 - 107 mmol/L 103   Carbon Dioxide (CO2) 22 - 29 mmol/L 26   Creatinine 0.51 - 0.95 mg/dL 0.99 (H)   GFR Estimate >60 mL/min/1.73m2 68   Anion Gap 7 - 15 mmol/L 9   Cholesterol <200 mg/dL 222 (H)   Patient Fasting?  Yes   HDL Cholesterol >=50 mg/dL 93   Hemoglobin A1C 0.0 - 5.6 % 6.0 (H)   LDL Cholesterol Calculated <=100 mg/dL 117 (H)   Non HDL Cholesterol <130 mg/dL 129   Triglycerides <150 mg/dL 61   Vitamin B12 232 - 1,245 pg/mL 2,066 (H)   % Retic % 1.5   Absolute Retic 10e6/uL 0.062   Color Urine Colorless, Straw, Light Yellow, Yellow  Light Yellow   Appearance Urine Clear  Clear   Glucose Urine Negative mg/dL Negative   Bilirubin Urine Negative  Negative   Ketones Urine Negative mg/dL Negative   Specific Gravity Urine 0.999 - 1.035  1.016   pH Urine 5.0 - 7.0  5.0   Protein Albumin Urine Negative mg/dL Negative   Urobilinogen mg/dL Normal, 2.0 mg/dL Normal   Nitrite Urine Negative  Negative   Blood Urine Negative  Small !   Leukocyte Esterase Urine Negative  Small !   WBC Urine 0-5 /HPF /HPF 5-10 !   RBC Urine 0-2 /HPF /HPF 2-5 !   Bacteria Urine None Seen /HPF Few !   Squamous Epithelial /LPF Urine None Seen /LPF Many !   Mucus Urine None Seen /LPF Present !       Assessment and plan:    This 53-year-old woman has a family history of type 2 diabetes and a personal history of gestational diabetes.  Her A1c is generally in the prediabetes level.  She would like to avoid getting diabetes and has chosen to go on metformin.  She is very active but is unable to lose enough weight to get her BMI less than 27.  I will refer her to weight management clinic to discuss the options with them.  I also encouraged her to talk to her primary care doctor about her concerns.  All of her questions were answered  and she was happy with the plan    I spent 12 minutes on the video visit with the patient today (start time 8: 00 end time 8: 1 2).  On the same day of visit I spent an additional 10 minutes reviewing her chart, reviewing and interpreting lab test, ordering a referral, and doing documentation.      Cary Arteaga MD

## 2024-06-26 NOTE — NURSING NOTE
Is the patient currently in the state of [place]? YES    Visit mode:VIDEO    If the visit is dropped, the patient can be reconnected by: VIDEO VISIT: Text to cell phone:   Telephone Information:   Mobile [phone]       Will anyone else be joining the visit? NO  (If patient encounters technical issues they should call [phone]    How would you like to obtain your AVS? MyChart    Are changes needed to the allergy or medication list? No    Are refills needed on medications prescribed by this physician? NO    Reason for visit: RECHECK and Video Visit    Jenn SORENSON

## 2024-09-09 ENCOUNTER — OFFICE VISIT (OUTPATIENT)
Dept: INTERNAL MEDICINE | Facility: CLINIC | Age: 54
End: 2024-09-09
Payer: COMMERCIAL

## 2024-09-09 VITALS
DIASTOLIC BLOOD PRESSURE: 78 MMHG | WEIGHT: 166 LBS | SYSTOLIC BLOOD PRESSURE: 118 MMHG | HEART RATE: 70 BPM | HEIGHT: 63 IN | RESPIRATION RATE: 18 BRPM | OXYGEN SATURATION: 99 % | BODY MASS INDEX: 29.41 KG/M2 | TEMPERATURE: 98.1 F

## 2024-09-09 DIAGNOSIS — Z00.00 ROUTINE GENERAL MEDICAL EXAMINATION AT A HEALTH CARE FACILITY: Primary | ICD-10-CM

## 2024-09-09 DIAGNOSIS — N95.1 PERIMENOPAUSE: ICD-10-CM

## 2024-09-09 DIAGNOSIS — R06.83 SNORING: ICD-10-CM

## 2024-09-09 DIAGNOSIS — K43.9 VENTRAL HERNIA WITHOUT OBSTRUCTION OR GANGRENE: ICD-10-CM

## 2024-09-09 DIAGNOSIS — Z12.31 ENCOUNTER FOR SCREENING MAMMOGRAM FOR MALIGNANT NEOPLASM OF BREAST: ICD-10-CM

## 2024-09-09 DIAGNOSIS — Z23 ENCOUNTER FOR VACCINATION: ICD-10-CM

## 2024-09-09 DIAGNOSIS — Z12.11 COLON CANCER SCREENING: ICD-10-CM

## 2024-09-09 DIAGNOSIS — E78.2 MIXED HYPERLIPIDEMIA: ICD-10-CM

## 2024-09-09 DIAGNOSIS — E66.3 OVERWEIGHT (BMI 25.0-29.9): ICD-10-CM

## 2024-09-09 DIAGNOSIS — R73.03 PREDIABETES: ICD-10-CM

## 2024-09-09 PROBLEM — Z13.6 CARDIOVASCULAR SCREENING; LDL GOAL LESS THAN 130: Status: RESOLVED | Noted: 2022-10-26 | Resolved: 2024-09-09

## 2024-09-09 PROBLEM — R03.0 ELEVATED BLOOD PRESSURE READING WITHOUT DIAGNOSIS OF HYPERTENSION: Status: RESOLVED | Noted: 2022-10-28 | Resolved: 2024-09-09

## 2024-09-09 PROCEDURE — 99396 PREV VISIT EST AGE 40-64: CPT | Mod: 25 | Performed by: INTERNAL MEDICINE

## 2024-09-09 PROCEDURE — 90750 HZV VACC RECOMBINANT IM: CPT | Performed by: INTERNAL MEDICINE

## 2024-09-09 PROCEDURE — 90471 IMMUNIZATION ADMIN: CPT | Performed by: INTERNAL MEDICINE

## 2024-09-09 PROCEDURE — 99214 OFFICE O/P EST MOD 30 MIN: CPT | Mod: 25 | Performed by: INTERNAL MEDICINE

## 2024-09-09 RX ORDER — DIAZEPAM 2 MG
1 TABLET ORAL
COMMUNITY
Start: 2024-03-01

## 2024-09-09 NOTE — ASSESSMENT & PLAN NOTE
Chronic. On metformin since 10/2022. Despite this slight rise in A1c over the years.   - Discussed we can increase metformin to help with insulin resistance and possibly with weight loss, she is in agreement  - Increase metformin from 500 BID -> 1000 mg AM/500 mg PM  - Repeat A1c next time

## 2024-09-09 NOTE — ASSESSMENT & PLAN NOTE
Last lipids 6/20/24 Tchol 222, HDL 93, , TG 61. Improved from previous with -140s with healthy lifestyle. ASCVD risk today is low 0.9%.   - Continue lifestyle management  - Does have strong family hx of strokes and CAD; she is going to look into cost of CAC; can order if she would like

## 2024-09-09 NOTE — ASSESSMENT & PLAN NOTE
Patient describes a right-sided bulge underneath her ribs that pops out from time to time and is very painful.  She is able to push it back in but with significant discomfort.  Has never had any abdominal surgeries.  Discussed with patient this is an odd place for a hernia with no known abdominal wall defect.  - Will investigate further with CT abdomen

## 2024-09-09 NOTE — ASSESSMENT & PLAN NOTE
BMI 29.78 today. Associated with HLD, pre-DM. Discussed with patient indications for GLP1 for weight loss, she is right on the fence of insurance coverage (although looking briefly her insurance may not cover medications for obesity).   - For now, recommend continue great active and healthy lifestyle and diet  - We are going to increase metformin slightly to help with insulin resistance  - she will look into coverage of GLP1

## 2024-09-09 NOTE — ASSESSMENT & PLAN NOTE
We discussed healthy lifestyle, nutrition, cardiovascular risk reduction, self care, safety, sunscreen, and timing of cancer screening.  Health maintenance screening and immunizations reviewed with the patient.    - Labs updated 6/11/24  - Mammo and colonoscopy due and ordered  - Shingles vaccine today; declines flu and COVID for now   - Last pap 10/2022, repeat due 10/2027

## 2024-09-09 NOTE — PROGRESS NOTES
Preventive Care Visit  [facility] Rebecca Lozano MD, Internal Medicine  Sep 9, 2024      Assessment & Plan   Problem List Items Addressed This Visit          Respiratory    Snoring     Snoring getting louder recently.  - Sleep referral          Relevant Orders    Adult Sleep Eval & Management  Referral       Endocrine    Prediabetes     Chronic. On metformin since 10/2022. Despite this slight rise in A1c over the years.   - Discussed we can increase metformin to help with insulin resistance and possibly with weight loss, she is in agreement  - Increase metformin from 500 BID -> 1000 mg AM/500 mg PM  - Repeat A1c next time         Relevant Medications    metFORMIN (GLUCOPHAGE) 500 MG tablet    Mixed hyperlipidemia     Last lipids 6/20/24 Tchol 222, HDL 93, , TG 61. Improved from previous with -140s with healthy lifestyle. ASCVD risk today is low 0.9%.   - Continue lifestyle management  - Does have strong family hx of strokes and CAD; she is going to look into cost of CAC; can order if she would like            Other    Routine general medical examination at a health care facility - Primary     We discussed healthy lifestyle, nutrition, cardiovascular risk reduction, self care, safety, sunscreen, and timing of cancer screening.  Health maintenance screening and immunizations reviewed with the patient.    - Labs updated 6/11/24  - Mammo and colonoscopy due and ordered  - Shingles vaccine today; declines flu and COVID for now   - Last pap 10/2022, repeat due 10/2027         Relevant Orders    Basic metabolic panel    Hepatitis B Surface Antibody    Overweight (BMI 25.0-29.9)     BMI 29.78 today. Associated with HLD, pre-DM. Discussed with patient indications for GLP1 for weight loss, she is right on the fence of insurance coverage (although looking briefly her insurance may not cover medications for obesity).   - For now, recommend continue great active and  "healthy lifestyle and diet  - We are going to increase metformin slightly to help with insulin resistance  - she will look into coverage of GLP1         Relevant Orders    Adult Comprehensive Weight Management  Referral    Perimenopause     Thought she was through menopause but had period again last week.   - Check estradiol, FSH, LH  - Does have GYN appt 10/1/24         Relevant Orders    Estradiol    Follicle stimulating hormone    Luteinizing Hormone    Ventral hernia without obstruction or gangrene     Patient describes a right-sided bulge underneath her ribs that pops out from time to time and is very painful.  She is able to push it back in but with significant discomfort.  Has never had any abdominal surgeries.  Discussed with patient this is an odd place for a hernia with no known abdominal wall defect.  - Will investigate further with CT abdomen          Relevant Orders    CT Abdomen w Contrast     Other Visit Diagnoses       Colon cancer screening        Relevant Orders    Colonoscopy Screening  Referral    Encounter for screening mammogram for malignant neoplasm of breast        Relevant Orders    MA Screen Bilateral w/Arvind    Encounter for vaccination        Relevant Orders    ZOSTER RECOMBINANT ADJUVANTED (SHINGRIX) (Completed)             Patient has been advised of split billing requirements and indicates understanding: Yes       BMI  Estimated body mass index is 29.78 kg/m  as calculated from the following:    Height as of this encounter: 1.59 m (5' 2.6\").    Weight as of this encounter: 75.3 kg (166 lb).   Weight management plan: Discussed healthy diet and exercise guidelines    Counseling  Appropriate preventive services were addressed with this patient via screening, questionnaire, or discussion as appropriate for fall prevention, nutrition, physical activity, Tobacco-use cessation, social engagement, weight loss and cognition.  Checklist reviewing preventive services available " has been given to the patient.  Reviewed patient's diet, addressing concerns and/or questions.   She is at risk for psychosocial distress and has been provided with information to reduce risk.     FUTURE APPOINTMENTS:       - Follow-up visit in 6 weeks       Tima Ocasio is a 53 year old, presenting for the following:  Physical        9/9/2024    11:31 AM   Additional Questions   Roomed by Antwan Arias   Accompanied by N/A        Health Care Directive  Patient does not have a Health Care Directive or Living Will: Discussed advance care planning with patient; information given to patient to review.    HPI    Ninfa is here for physical and to establish care today.     Got period last week, unclear when the last one was. No vaginal irritation or itching.     Pre-DM: A1c 6.0 (6/11/24). On metformin 500 mg BID (started 10/2022).   - Dad DM, PGM DM, maternal aunt pre-DM     HLD: lipids 6/20/24 Tchol 222, HDL 93, , TG 61  The 10-year ASCVD risk score (Raymond GREENWOOD, et al., 2019) is: 0.9%    Values used to calculate the score:      Age: 53 years      Sex: Female      Is Non- : No      Diabetic: No      Tobacco smoker: No      Systolic Blood Pressure: 118 mmHg      Is BP treated: No      HDL Cholesterol: 93 mg/dL      Total Cholesterol: 222 mg/dL  - Dad had CAD that required PCI x4      Wt Readings from Last 4 Encounters:   09/09/24 75.3 kg (166 lb)   06/26/24 75.8 kg (167 lb)   06/20/24 75.9 kg (167 lb 6.4 oz)   12/18/23 78.9 kg (174 lb)     Acid reflux: On tagamet for a while in the past.     Does snore and coughs throughout the night.     HCM: Needs mammogram and colonoscopy.     Does kickboxing 3-4 times a week. Walks frequently for exercise.     Mom had ovarian cancer.         9/9/2024   General Health   How would you rate your overall physical health? (!) FAIR   Feel stress (tense, anxious, or unable to sleep) Only a little          9/9/2024   Nutrition   Three or more servings of  calcium each day? Yes   Diet: Regular (no restrictions)   How many servings of fruit and vegetables per day? (!) 2-3   How many sweetened beverages each day? 0-1            9/9/2024   Exercise   Days per week of moderate/strenous exercise 4 days            9/9/2024   Social Factors   Frequency of gathering with friends or relatives Once a week   Worry food won't last until get money to buy more No   Food not last or not have enough money for food? No   Do you have housing? (Housing is defined as stable permanent housing and does not include staying ouside in a car, in a tent, in an abandoned building, in an overnight shelter, or couch-surfing.) Yes   Are you worried about losing your housing? No   Lack of transportation? No   Unable to get utilities (heat,electricity)? No            9/9/2024   Fall Risk   Fallen 2 or more times in the past year? No   Trouble with walking or balance? No             9/9/2024   Dental   Dentist two times every year? Yes            9/9/2024   TB Screening   Were you born outside of the [place]? No              Today's PHQ-2 Score:       6/26/2024     7:56 AM   PHQ-2 ( 1999 [employer])   Q1: Little interest or pleasure in doing things 0   Q2: Feeling down, depressed or hopeless 0   PHQ-2 Score 0         9/9/2024   Substance Use   Alcohol more than 3/day or more than 7/wk No   Do you use any other substances recreationally? No        Social History     Tobacco Use    Smoking status: Never     Passive exposure: Never    Smokeless tobacco: Never   Vaping Use    Vaping status: Never Used   Substance Use Topics    Alcohol use: Yes     Comment: sometimes     Drug use: No           12/18/2023   LAST FHS-7 RESULTS   1st degree relative breast or ovarian cancer Yes   Any relative bilateral breast cancer No   Any male have breast cancer No   Any ONE woman have BOTH breast AND ovarian cancer Yes   Any woman with breast cancer before 50yrs No   2 or more relatives with breast AND/OR ovarian cancer No   2 or  "more relatives with breast AND/OR bowel cancer No           Mammogram Screening - Mammogram every 1-2 years updated in Health Maintenance based on mutual decision making        9/9/2024   STI Screening   New sexual partner(s) since last STI/HIV test? No        History of abnormal Pap smear: No - age 30- 64 PAP with HPV every 5 years recommended        Latest Ref Rng & Units 10/28/2022     9:07 AM   PAP / HPV   PAP  Negative for Intraepithelial Lesion or Malignancy (NILM)    HPV 16 DNA Negative Negative    HPV 18 DNA Negative Negative    Other HR HPV Negative Negative      ASCVD Risk   The 10-year ASCVD risk score (Raymond GREENWOOD, et al., 2019) is: 0.9%    Values used to calculate the score:      Age: 53 years      Sex: Female      Is Non- : No      Diabetic: No      Tobacco smoker: No      Systolic Blood Pressure: 118 mmHg      Is BP treated: No      HDL Cholesterol: 93 mg/dL      Total Cholesterol: 222 mg/dL         Reviewed and updated as needed this visit by Provider   Tobacco  Allergies  Meds  Problems  Med Hx  Surg Hx  Fam Hx            Past Medical History:   Diagnosis Date    Gestational diabetes     Uncomplicated asthma      History reviewed. No pertinent surgical history.      Review of Systems  Constitutional, neuro, ENT, endocrine, pulmonary, cardiac, gastrointestinal, genitourinary, musculoskeletal, integument and psychiatric systems are negative, except as otherwise noted.     Objective    Exam  /78   Pulse 70   Temp 98.1  F (36.7  C) (Oral)   Resp 18   Ht 1.59 m (5' 2.6\")   Wt 75.3 kg (166 lb)   LMP 08/01/2023 (Approximate)   SpO2 99%   BMI 29.78 kg/m     Estimated body mass index is 29.78 kg/m  as calculated from the following:    Height as of this encounter: 1.59 m (5' 2.6\").    Weight as of this encounter: 75.3 kg (166 lb).    Physical Exam  GENERAL: alert and no distress  EYES: Eyes grossly normal to inspection and conjunctivae and sclerae " normal  HENT: ear canals and TM's normal, nose and mouth without ulcers or lesions  NECK: no adenopathy, no asymmetry, masses, or scars  RESP: lungs clear to auscultation - no rales, rhonchi or wheezes  CV: regular rate and rhythm, normal S1 S2, no S3 or S4, no murmur, click or rub, no peripheral edema  ABDOMEN: soft, nontender, no hepatosplenomegaly, no masses and bowel sounds normal  MS: no gross musculoskeletal defects noted, no edema  SKIN: no suspicious lesions or rashes on exposed skin   NEURO: Normal strength and tone, mentation intact and speech normal  PSYCH: mentation appears normal, affect normal/bright        Signed Electronically by: Marilynn Lozano MD

## 2024-09-09 NOTE — ASSESSMENT & PLAN NOTE
Thought she was through menopause but had period again last week.   - Check estradiol, FSH, LH  - Does have GYN appt 10/1/24

## 2024-09-09 NOTE — PATIENT INSTRUCTIONS
You can look in to coronary calcium scan.     Can also look if insurance covers wegovy or zepbound for obesity.     Patient Education   Preventive Care Advice   This is general advice given by our system to help you stay healthy. However, your care team may have specific advice just for you. Please talk to your care team about your preventive care needs.  Nutrition  Eat 5 or more servings of fruits and vegetables each day.  Try wheat bread, brown rice and whole grain pasta (instead of white bread, rice, and pasta).  Get enough calcium and vitamin D. Check the label on foods and aim for 100% of the RDA (recommended daily allowance).  Lifestyle  Exercise at least 150 minutes each week  (30 minutes a day, 5 days a week).  Do muscle strengthening activities 2 days a week. These help control your weight and prevent disease.  No smoking.  Wear sunscreen to prevent skin cancer.  Have a dental exam and cleaning every 6 months.  Yearly exams  See your health care team every year to talk about:  Any changes in your health.  Any medicines your care team has prescribed.  Preventive care, family planning, and ways to prevent chronic diseases.  Shots (vaccines)   HPV shots (up to age 26), if you've never had them before.  Hepatitis B shots (up to age 59), if you've never had them before.  COVID-19 shot: Get this shot when it's due.  Flu shot: Get a flu shot every year.  Tetanus shot: Get a tetanus shot every 10 years.  Pneumococcal, hepatitis A, and RSV shots: Ask your care team if you need these based on your risk.  Shingles shot (for age 50 and up)  General health tests  Diabetes screening:  Starting at age 35, Get screened for diabetes at least every 3 years.  If you are younger than age 35, ask your care team if you should be screened for diabetes.  Cholesterol test: At age 39, start having a cholesterol test every 5 years, or more often if advised.  Bone density scan (DEXA): At age 50, ask your care team if you should have  this scan for osteoporosis (brittle bones).  Hepatitis C: Get tested at least once in your life.  STIs (sexually transmitted infections)  Before age 24: Ask your care team if you should be screened for STIs.  After age 24: Get screened for STIs if you're at risk. You are at risk for STIs (including HIV) if:  You are sexually active with more than one person.  You don't use condoms every time.  You or a partner was diagnosed with a sexually transmitted infection.  If you are at risk for HIV, ask about PrEP medicine to prevent HIV.  Get tested for HIV at least once in your life, whether you are at risk for HIV or not.  Cancer screening tests  Cervical cancer screening: If you have a cervix, begin getting regular cervical cancer screening tests starting at age 21.  Breast cancer scan (mammogram): If you've ever had breasts, begin having regular mammograms starting at age 40. This is a scan to check for breast cancer.  Colon cancer screening: It is important to start screening for colon cancer at age 45.  Have a colonoscopy test every 10 years (or more often if you're at risk) Or, ask your provider about stool tests like a FIT test every year or Cologuard test every 3 years.  To learn more about your testing options, visit:   .  For help making a decision, visit:   https://bit.ly/fl40490.  Prostate cancer screening test: If you have a prostate, ask your care team if a prostate cancer screening test (PSA) at age 55 is right for you.  Lung cancer screening: If you are a current or former smoker ages 50 to 80, ask your care team if ongoing lung cancer screenings are right for you.  For informational purposes only. Not to replace the advice of your health care provider. Copyright   2023 [address]. All rights reserved. Clinically reviewed by the [facility] Transitions Program. [employer] [id number] - REV 01/24.

## 2024-09-18 ENCOUNTER — TELEPHONE (OUTPATIENT)
Dept: INTERNAL MEDICINE | Facility: CLINIC | Age: 54
End: 2024-09-18
Payer: COMMERCIAL

## 2024-09-18 NOTE — TELEPHONE ENCOUNTER
Reason for Call:  Appointment Request    Patient requesting this type of appt:  sleep eval    Requested provider:      Reason patient unable to be scheduled: Not within requested timeframe    When does patient want to be seen/preferred time:  sooner than scheduled    Comments:     Could we send this information to you [facility] or would you prefer to receive a phone call?:   No preference   Okay to leave a detailed message?: Yes at Cell number on file:    Telephone Information:   Mobile [phone]       Call taken on 9/18/2024 at 4:17 PM by Araseli Doll

## 2024-09-19 NOTE — TELEPHONE ENCOUNTER
Chart reviewed. Referral is for routine consult. Patient is scheduled first available appointment and on wait list. Clinic will reach out if sooner appointment becomes available.     Lupis CRAIG RN  [facility]

## 2024-09-25 ENCOUNTER — ANCILLARY PROCEDURE (OUTPATIENT)
Dept: CT IMAGING | Facility: CLINIC | Age: 54
End: 2024-09-25
Attending: INTERNAL MEDICINE
Payer: COMMERCIAL

## 2024-09-25 ENCOUNTER — LAB (OUTPATIENT)
Dept: LAB | Facility: CLINIC | Age: 54
End: 2024-09-25
Payer: COMMERCIAL

## 2024-09-25 DIAGNOSIS — Z00.00 ROUTINE GENERAL MEDICAL EXAMINATION AT A HEALTH CARE FACILITY: ICD-10-CM

## 2024-09-25 DIAGNOSIS — N95.1 PERIMENOPAUSE: ICD-10-CM

## 2024-09-25 DIAGNOSIS — K43.9 VENTRAL HERNIA WITHOUT OBSTRUCTION OR GANGRENE: ICD-10-CM

## 2024-09-25 LAB
ANION GAP SERPL CALCULATED.3IONS-SCNC: 9 MMOL/L (ref 7–15)
BUN SERPL-MCNC: 16.7 MG/DL (ref 6–20)
CALCIUM SERPL-MCNC: 9.2 MG/DL (ref 8.8–10.4)
CHLORIDE SERPL-SCNC: 103 MMOL/L (ref 98–107)
CREAT SERPL-MCNC: 0.91 MG/DL (ref 0.51–0.95)
EGFRCR SERPLBLD CKD-EPI 2021: 75 ML/MIN/1.73M2
ESTRADIOL SERPL-MCNC: 17 PG/ML
FSH SERPL IRP2-ACNC: 45.9 MIU/ML
GLUCOSE SERPL-MCNC: 85 MG/DL (ref 70–99)
HBV SURFACE AB SERPL IA-ACNC: <3.5 M[IU]/ML
HBV SURFACE AB SERPL IA-ACNC: NONREACTIVE M[IU]/ML
HCO3 SERPL-SCNC: 25 MMOL/L (ref 22–29)
LH SERPL-ACNC: 27.5 MIU/ML
POTASSIUM SERPL-SCNC: 4.3 MMOL/L (ref 3.4–5.3)
SODIUM SERPL-SCNC: 137 MMOL/L (ref 135–145)

## 2024-09-25 PROCEDURE — 82670 ASSAY OF TOTAL ESTRADIOL: CPT

## 2024-09-25 PROCEDURE — 86706 HEP B SURFACE ANTIBODY: CPT

## 2024-09-25 PROCEDURE — 74160 CT ABDOMEN W/CONTRAST: CPT | Performed by: RADIOLOGY

## 2024-09-25 PROCEDURE — 83001 ASSAY OF GONADOTROPIN (FSH): CPT

## 2024-09-25 PROCEDURE — 80048 BASIC METABOLIC PNL TOTAL CA: CPT

## 2024-09-25 PROCEDURE — 36415 COLL VENOUS BLD VENIPUNCTURE: CPT

## 2024-09-25 PROCEDURE — 83002 ASSAY OF GONADOTROPIN (LH): CPT

## 2024-09-25 RX ORDER — IOPAMIDOL 755 MG/ML
91 INJECTION, SOLUTION INTRAVASCULAR ONCE
Status: COMPLETED | OUTPATIENT
Start: 2024-09-25 | End: 2024-09-25

## 2024-09-25 RX ADMIN — IOPAMIDOL 91 ML: 755 INJECTION, SOLUTION INTRAVASCULAR at 17:54

## 2024-10-01 ENCOUNTER — OFFICE VISIT (OUTPATIENT)
Dept: OBGYN | Facility: CLINIC | Age: 54
End: 2024-10-01
Payer: COMMERCIAL

## 2024-10-01 VITALS — DIASTOLIC BLOOD PRESSURE: 85 MMHG | BODY MASS INDEX: 29.87 KG/M2 | SYSTOLIC BLOOD PRESSURE: 123 MMHG | WEIGHT: 166.5 LBS

## 2024-10-01 DIAGNOSIS — Z80.41 FAMILY HISTORY OF MALIGNANT NEOPLASM OF OVARY: ICD-10-CM

## 2024-10-01 DIAGNOSIS — N95.0 PMB (POSTMENOPAUSAL BLEEDING): Primary | ICD-10-CM

## 2024-10-01 PROCEDURE — 58100 BIOPSY OF UTERUS LINING: CPT | Performed by: OBSTETRICS & GYNECOLOGY

## 2024-10-01 PROCEDURE — 99459 PELVIC EXAMINATION: CPT | Performed by: OBSTETRICS & GYNECOLOGY

## 2024-10-01 PROCEDURE — 99203 OFFICE O/P NEW LOW 30 MIN: CPT | Mod: 25 | Performed by: OBSTETRICS & GYNECOLOGY

## 2024-10-01 PROCEDURE — 88305 TISSUE EXAM BY PATHOLOGIST: CPT | Performed by: PATHOLOGY

## 2024-10-01 NOTE — PROGRESS NOTES
Subjective  54 year old non-pregnant female presents today complaining of post menopausal bleeding.  Patient had gone a whole year without a menses but recently started to have some vaginal spotting.  Patient states for the last month she had two episodes of vaginal bleeding.  Last month she had vaginal bleeding for a week long and now she is just getting over another week long.  She is bleeding enough to use a tampon.  Some clots.  She has a history of HPV.  Patient's mother had ovarian cancer that started in the fallopian tube.  She passed at 69.  No tobacco abuse.  1 .  Patient is not sexually active.  No problems urinating.  Normal bowel movements.        I personally reviewed the abdominal CT and the findings were benign.      ROS: 10 point ROS neg other than the symptoms noted above in the HPI.  Past Medical History:   Diagnosis Date    Gestational diabetes     Uncomplicated asthma      History reviewed. No pertinent surgical history.  Family History   Problem Relation Age of Onset    Ovarian Cancer Mother     Other - See Comments Mother         fallopian tube cancer    Prostate Cancer Father     Diabetes Father     Cerebrovascular Disease Maternal Grandmother     Diabetes Paternal Grandmother     Diabetes Paternal Grandfather      Social History     Tobacco Use    Smoking status: Never     Passive exposure: Never    Smokeless tobacco: Never   Substance Use Topics    Alcohol use: Yes     Comment: sometimes          Objective  Vitals: /85 (BP Location: Right arm, Cuff Size: Adult Regular)   Wt 75.5 kg (166 lb 8 oz)   LMP 2023 (Approximate)   BMI 29.87 kg/m    BMI= Body mass index is 29.87 kg/m .    General appearance=well developed, well-nourished female  Gait=normal  Psych=mood is stable, alert and oriented x3  Abd=soft, Nontender/nondistended, no masses, no signs of hernias, no evidence of hepatosplenomegaly  PELVIC:    External genitalia: normal without lesions or masses  Urethral meatus:  no lesions or prolapse noted, normal size  Urethra: no masses, non tender  Bladder: non tender, no fullness  Vagina: normal mucosa and rugae, no discharge.  Cervix: normal without lesion, no cervical motion tenderness, healthy, multiparous  Uterus: small, mobile, nontender.  Adnexa: non tender, without masses  Rectal: deffered  Ext=no clubbing or cyanosis, no swelling      Procedure:  Endometrial biopsy    Indication:  Post-menopausal bleeding    Discussed risk of bleeding, infection, uterine perforation, cramping pain.  Pt agreed to proceed with procedure after all questions answered.    Speculum placed and cervix visualized.  Cervix cleansed with betadine x 3.  Allis clamp placed on anterior lip of the cervix.  Endometrial biopsy pipelle passed through cervix and uterus sounded to 7 cm.  Biopsy specimen collected with one pass with return of moderate amount of pink tissue.  Specimen placed in a labeled container and set aside to be sent to pathology.  Allis clamp removed from the cervix and sites hemostatic.  No bleeding noted from cervical os.     Patient tolerated the procedure well.  There were no apparent complications and bleeding was minimal.    She is instructed to use no tampons and have no intercourse for the next 5 days.      Assessment  1.)  Post menopausal bleeding  2.)  Family history of ovarian/fallopian tube cancer      Plan  1.)  Endometrial biopsy today  2.)  Schedule pelvic ultrasound  3.)  Follow-up to discuss treatment options      One undiagnosed new problem with uncertain prognosis and procedure performed.    Nursing notes read and reviewed    Hailey Perera DO

## 2024-10-03 PROBLEM — Z00.00 ROUTINE GENERAL MEDICAL EXAMINATION AT A HEALTH CARE FACILITY: Status: RESOLVED | Noted: 2024-09-09 | Resolved: 2024-10-03

## 2024-10-03 PROBLEM — Z80.41 FAMILY HISTORY OF MALIGNANT NEOPLASM OF OVARY: Status: ACTIVE | Noted: 2024-10-03

## 2024-10-03 PROBLEM — N95.0 PMB (POSTMENOPAUSAL BLEEDING): Status: ACTIVE | Noted: 2024-10-03

## 2024-10-03 LAB
PATH REPORT.COMMENTS IMP SPEC: NORMAL
PATH REPORT.FINAL DX SPEC: NORMAL
PATH REPORT.GROSS SPEC: NORMAL
PATH REPORT.MICROSCOPIC SPEC OTHER STN: NORMAL
PATH REPORT.RELEVANT HX SPEC: NORMAL
PHOTO IMAGE: NORMAL

## 2024-10-04 NOTE — PATIENT INSTRUCTIONS
Endometrial Biopsy  Endometrial biopsy is a procedure used to study the endometrium (lining of the uterus). It is usually done in your healthcare provider s office. During the biopsy, small tissue samples are taken from the uterine lining. These are then sent to a lab for study. If any problems are found, you and your healthcare provider will discuss treatment options. The biopsy usually takes less than 20 minutes, and you can often go back to your normal routine as soon as the procedure is over.   Reasons for the Procedure  Endometrial biopsy may help pinpoint the cause of certain problems. These include:  Bleeding after menopause  Heavy or irregular periods  Bleeding associated with hormone replacement therapy  Prolonged bleeding  Abnormal Pap test results  Trouble getting pregnant (fertility problems)    What Are the Risks?  Problems with endometrial biopsy are rare, but can include:  Bleeding  Infection  Damage to the uterine wall (very rare)  Getting Ready for the Procedure  Your doctor will ask about your health and any medications you take, such as blood thinners. Before your biopsy, you may have tests to make sure you re not pregnant or have an infection. You may also be asked to sign a consent form. A day or two before the procedure:   Avoid using creams or other vaginal medications.  Avoid douching.  Ask your healthcare provider if you should take pain medications shortly before the test.   During the Biopsy  You will be asked to lie on an exam table with your knees bent, just as you do for a Pap test.  You may have a brief pelvic exam. An instrument called a speculum is then inserted into the vagina to hold it open.  An antiseptic solution is applied to the cervix. The cervix may also be numbed with an anesthetic or dilated to widen the opening.  A small suction tube is passed through the cervix into the uterus.  It is normal to feel some cramping when the tube is inserted. But tell your healthcare  provider if you have severe cramping or are very uncomfortable.  Using mild suction, samples are taken from the uterine lining. You may feel pinching or additional cramping when this is done.  The tube and speculum are then removed and the samples sent to a lab for study.  After the Procedure  If you feel lightheaded or dizzy, you can rest on the table until you re ready to get dressed.  For a few hours, you may feel some mild cramping. This can usually be relieved with over-the-counter pain medications.  You may have some bleeding for a few days. Use pads instead of tampons.  Don t douche or use any vaginal medications unless your healthcare provider says it s okay.  Ask your healthcare provider when it s okay to have sex again.  Follow-Up  It will take about a week for the biopsy results to come back from the lab. Then you and your healthcare provider can discuss the results. These may show that no treatment is required. Or, you may be scheduled for a follow-up appointment and further tests. If your biopsy was done for fertility problems, be sure to record the day when your next period begins.     Call your healthcare provider if you have:  Heavy bleeding (more than a pad an hour for 2 hours).  Severe cramping, or increasing pain.  Fever over 101 F.  Foul-smelling or unusual vaginal discharge.     What you may expect after an endometrial biopsy:  Mild cramping for less than 48 hours is to be expected, if you have can take ibuprofen or Motrin you may use this for the cramping.   A small amount of bleeding would be considered normal as well.    You may resume your normal activities including sexual intercourse as soon as you feel ready.       WARNING SIGNS:  If you are experiencing:  Fever  Foul smelling vaginal discharge  Cramping lasting longer than 48 hours  Severe cramping  Bleeding heavier than a period  CALL THE CLINIC IMMEDIATELY    You will be contacted with the results of your biopsy in about one week.   A  follow up plan will be made with you when your results are available.     Hailey Perera, DO

## 2024-10-07 ENCOUNTER — ANCILLARY PROCEDURE (OUTPATIENT)
Dept: ULTRASOUND IMAGING | Facility: CLINIC | Age: 54
End: 2024-10-07
Attending: OBSTETRICS & GYNECOLOGY
Payer: COMMERCIAL

## 2024-10-07 DIAGNOSIS — N95.0 PMB (POSTMENOPAUSAL BLEEDING): ICD-10-CM

## 2024-10-07 PROCEDURE — 76856 US EXAM PELVIC COMPLETE: CPT | Performed by: RADIOLOGY

## 2024-10-07 PROCEDURE — 76830 TRANSVAGINAL US NON-OB: CPT | Performed by: RADIOLOGY

## 2024-10-10 ENCOUNTER — TELEPHONE (OUTPATIENT)
Dept: OBGYN | Facility: CLINIC | Age: 54
End: 2024-10-10
Payer: COMMERCIAL

## 2024-10-10 NOTE — TELEPHONE ENCOUNTER
[facility] Call Center    Phone Message    May a detailed message be left on voicemail: yes     Reason for Call:  Pt returning call from Greil regarding results. Writer instructed by secure chat to send TE. Please call pt     Action Taken: Message routed to:  Other:  OBGYCATIA    Travel Screening: Not Applicable

## 2024-10-11 NOTE — TELEPHONE ENCOUNTER
[facility] Call Center    Phone Message    May a detailed message be left on voicemail: yes     Reason for Call: Other: Pt returning call from Dr. Perera. She states she's available the rest of the day. Also, pt states it's ok to leave a detailed message if unable to get ahold of her. Please reach back out to her.      Action Taken: Other: mg high    Travel Screening: Not Applicable     Date of Service:

## 2024-10-14 ENCOUNTER — TELEPHONE (OUTPATIENT)
Dept: OBGYN | Facility: CLINIC | Age: 54
End: 2024-10-14
Payer: COMMERCIAL

## 2024-10-14 ENCOUNTER — PREP FOR PROCEDURE (OUTPATIENT)
Dept: OBGYN | Facility: CLINIC | Age: 54
End: 2024-10-14
Payer: COMMERCIAL

## 2024-10-14 DIAGNOSIS — N95.0 PMB (POSTMENOPAUSAL BLEEDING): Primary | ICD-10-CM

## 2024-10-14 NOTE — TELEPHONE ENCOUNTER
Associated Diagnoses    PMB (postmenopausal bleeding) [N95.0]  - Primary        Source Order Set    Order Set Name Order ID    [id number]     Case Request: Case Info    Panel 1    Providers    Provider Role Service   Hailey Perera DO Primary Gynecology     Procedures    Procedure Laterality Anesthesia Region   Hysteroscopy dilation and curettage [20956 (CPT )] N/A MAC Uterus                  Requested date:   Location:  OR   Patient class:      Pre-op diagnoses: PMB (postmenopausal bleeding)     Scheduling Instructions    Additional Instructions for the Case            SURGERY SCHEDULING AND PRECERTIFICATION    Medical Record Number: [id number]  Ninfa Simons  YOB: 1970   Phone: [phone] (home)   Primary Provider: Marilynn Lozano    Reason for Admit:  ICD-10 CODE:  PMB (postmenopausal bleeding) [N95.0]  - Primary    Surgeon: Hailey Perera DO  Surgical Procedure: Hysteroscopy dilation and curettage [19924 (CPT )]    Date of Surgery 12/5 Time of Surgery 1:00 p.m.  Surgery to be performed at:  [facility]   Status: Outpatient  Type of Anesthesia Anticipated: MAC    Sterilization consent:  Not applicable to procedure being performed.    Pre-Op: On 11/18 with Rika Schwarz  COVID testing:  Per Provider's discretion Covid testing is not indicated.     Post-Op:  2 weeks on 12/20 with Dr Perera    Pre-certification routed to Financial Counselors:  Auto routes via Case Request    Surgery packet mailed to patient's home address: Yes  Patient instructed NPO 12 hours prior to surgery, arrive according to the time the nurse gives patient when called prior to surgery, must have a .  Patient understood and agrees to the plan.      Requestor:  Bethany Rodriguez     Location:  [facility] [phone]\

## 2024-10-14 NOTE — PROGRESS NOTES
Surgical Assistant: Surgical Assist: Not needed  Multi Surgeon Case No  CSC okay No  H&P:  Pre-op options: PCP  Post-op:  2 weeks  Sterilization consent:  Not applicable to procedure being performed.  Vendor: No  Surgical time needed:  30 Minutes  ERAS patient: Yes    Hailey Perera DO

## 2024-10-28 ENCOUNTER — ANCILLARY PROCEDURE (OUTPATIENT)
Dept: MAMMOGRAPHY | Facility: CLINIC | Age: 54
End: 2024-10-28
Attending: INTERNAL MEDICINE
Payer: COMMERCIAL

## 2024-10-28 DIAGNOSIS — Z12.31 ENCOUNTER FOR SCREENING MAMMOGRAM FOR MALIGNANT NEOPLASM OF BREAST: ICD-10-CM

## 2024-10-28 PROCEDURE — 77063 BREAST TOMOSYNTHESIS BI: CPT | Mod: GC | Performed by: RADIOLOGY

## 2024-10-28 PROCEDURE — 77067 SCR MAMMO BI INCL CAD: CPT | Mod: GC | Performed by: RADIOLOGY

## 2024-11-18 ENCOUNTER — OFFICE VISIT (OUTPATIENT)
Dept: FAMILY MEDICINE | Facility: CLINIC | Age: 54
End: 2024-11-18
Payer: COMMERCIAL

## 2024-11-18 VITALS
SYSTOLIC BLOOD PRESSURE: 122 MMHG | HEART RATE: 74 BPM | OXYGEN SATURATION: 99 % | WEIGHT: 170.4 LBS | HEIGHT: 62 IN | DIASTOLIC BLOOD PRESSURE: 82 MMHG | RESPIRATION RATE: 10 BRPM | TEMPERATURE: 98.3 F | BODY MASS INDEX: 31.36 KG/M2

## 2024-11-18 DIAGNOSIS — R06.83 SNORING: ICD-10-CM

## 2024-11-18 DIAGNOSIS — N95.0 PMB (POSTMENOPAUSAL BLEEDING): ICD-10-CM

## 2024-11-18 DIAGNOSIS — R73.03 PREDIABETES: ICD-10-CM

## 2024-11-18 DIAGNOSIS — J45.20 MILD INTERMITTENT ASTHMA WITHOUT COMPLICATION: ICD-10-CM

## 2024-11-18 DIAGNOSIS — Z01.818 PREOP GENERAL PHYSICAL EXAM: Primary | ICD-10-CM

## 2024-11-18 LAB
ERYTHROCYTE [DISTWIDTH] IN BLOOD BY AUTOMATED COUNT: 12.2 % (ref 10–15)
EST. AVERAGE GLUCOSE BLD GHB EST-MCNC: 120 MG/DL
HBA1C MFR BLD: 5.8 % (ref 0–5.6)
HCT VFR BLD AUTO: 38.9 % (ref 35–47)
HGB BLD-MCNC: 13.3 G/DL (ref 11.7–15.7)
MCH RBC QN AUTO: 32.9 PG (ref 26.5–33)
MCHC RBC AUTO-ENTMCNC: 34.2 G/DL (ref 31.5–36.5)
MCV RBC AUTO: 96 FL (ref 78–100)
PLATELET # BLD AUTO: 315 10E3/UL (ref 150–450)
RBC # BLD AUTO: 4.04 10E6/UL (ref 3.8–5.2)
WBC # BLD AUTO: 6.8 10E3/UL (ref 4–11)

## 2024-11-18 PROCEDURE — 85027 COMPLETE CBC AUTOMATED: CPT | Performed by: PHYSICIAN ASSISTANT

## 2024-11-18 PROCEDURE — 80048 BASIC METABOLIC PNL TOTAL CA: CPT | Performed by: PHYSICIAN ASSISTANT

## 2024-11-18 PROCEDURE — 99214 OFFICE O/P EST MOD 30 MIN: CPT | Performed by: PHYSICIAN ASSISTANT

## 2024-11-18 PROCEDURE — 83036 HEMOGLOBIN GLYCOSYLATED A1C: CPT | Performed by: PHYSICIAN ASSISTANT

## 2024-11-18 PROCEDURE — 36415 COLL VENOUS BLD VENIPUNCTURE: CPT | Performed by: PHYSICIAN ASSISTANT

## 2024-11-18 ASSESSMENT — PAIN SCALES - GENERAL: PAINLEVEL_OUTOF10: NO PAIN (0)

## 2024-11-18 NOTE — H&P (VIEW-ONLY)
Preoperative Evaluation  [facility] [place]  [address], [address]  [place] [address]  Phone: [phone]  Fax: [phone]  Primary Provider: Marilynn Lozano MD  Pre-op Performing Provider: Rika Schwarz PA-C  Nov 18, 2024 11/18/2024   Surgical Information   What procedure is being done? D&C    Facility or Hospital where procedure/surgery will be performed: [facility]    Who is doing the procedure / surgery? Hailey Perera    Date of surgery / procedure: Dec 5 2024    Time of surgery / procedure: unknown    Where do you plan to recover after surgery? at home with family        Patient-reported     Fax number for surgical facility: Note does not need to be faxed, will be available electronically in Epic.    Assessment & Plan     The proposed surgical procedure is considered LOW risk.    Preop general physical exam  Patient is optimized for procedure as above  - CBC with platelets; Future  - CBC with platelets    PMB (postmenopausal bleeding)  Patient is scheduled for procedure as above sleep study    Snoring  She has upcoming appointment with sleep med to discuss     Prediabetes  She is taking metformin to manage this, her hemoglobin A1c is 5.8 and is improving.  - Basic metabolic panel  (Ca, Cl, CO2, Creat, Gluc, K, Na, BUN); Future  - Hemoglobin A1c; Future  - Basic metabolic panel  (Ca, Cl, CO2, Creat, Gluc, K, Na, BUN)  - Hemoglobin A1c    Mild intermittent asthma without complication  Well controlled       Possible Sleep Apnea:   she has upcoming appoint with sleep med as above         Risks and Recommendations  The patient has the following additional risks and recommendations for perioperative complications:  Obstructive Sleep Apnea:   Patient may have untreated sleep apnea is awaiting evaluation    Preoperative Medication Instructions  Antiplatelet or Anticoagulation Medication Instructions   - Patient is on no antiplatelet or anticoagulation  medications.    Additional Medication Instructions  Take all scheduled medications on the day of surgery EXCEPT for modifications listed below:   - metformin: DO NOT TAKE day of surgery.   - Herbal medications and vitamins: DO NOT TAKE 14 days prior to surgery.    Recommendation  Approval given to proceed with proposed procedure, without further diagnostic evaluation.    Tima Ocasio is a 54 year old, presenting for the following:  Pre-Op Exam          11/18/2024     2:49 PM   Additional Questions   Roomed by Nichelle SAEED   Accompanied by None         11/18/2024     2:49 PM   Patient Reported Additional Medications   Patient reports taking the following new medications NA     HPI related to upcoming procedure: She had been postmenopausal for approximately 1 year when she she had a few episodes of postmenopausal bleeding she was evaluated by gynecology and is scheduled for procedure as above        11/18/2024   Pre-Op Questionnaire   Have you ever had a heart attack or stroke? No    Have you ever had surgery on your heart or blood vessels, such as a stent placement, a coronary artery bypass, or surgery on an artery in your head, neck, heart, or legs? No    Do you have chest pain with activity? No    Do you have a history of heart failure? No    Do you currently have a cold, bronchitis or symptoms of other infection? No    Do you have a cough, shortness of breath, or wheezing? No    Do you or anyone in your family have previous history of blood clots? (!) YES dad may have had blood clots-- he has had cardiac stents, niece at age 24 had an embolic stroke   No personal history of clot   Do you or does anyone in your family have a serious bleeding problem such as prolonged bleeding following surgeries or cuts? No    Have you ever had problems with anemia or been told to take iron pills? (!) YES in her past, was turned away from giving blood in past     Have you had any abnormal blood loss such as black, tarry or  bloody stools, or abnormal vaginal bleeding? No    Have you ever had a blood transfusion? No    Are you willing to have a blood transfusion if it is medically needed before, during, or after your surgery? Yes    Have you or any of your relatives ever had problems with anesthesia? (!) UNKNOWN no personal history of anesthesia issues, she has not heard of any issues with anesthesia in her family    Do you have sleep apnea, excessive snoring or daytime drowsiness? (!) YES  She has initial appt set up for sleep study due to snoring   Do you have a CPAP machine? (!) NO has appt set up    Do you have any artifical heart valves or other implanted medical devices like a pacemaker, defibrillator, or continuous glucose monitor? No    Do you have artificial joints? No    Are you allergic to latex? No        Patient-reported     Health Care Directive  Patient does not have a Health Care Directive: Discussed advance care planning with patient; however, patient declined at this time.    Preoperative Review of    reviewed - controlled substances prescribed by other outside provider(s).      Status of Chronic Conditions:  ASTHMA - Patient has a longstanding history of moderate-severe Asthma . Patient has been doing well overall noting NO SYMPTOMS and continues on medication regimen consisting of albuterol as needed without adverse reactions or side effects.     Pre-DIABETES - Patient has a  history of DiabetesType prediabetes. Patient is being treated with diet, oral agents, and exercise and denies significant side effects. Control has been good, in fact improving. Complicating factors include but are not limited to: none.     Snoring-she does have upcoming evaluation with sleep med to evaluate her for sleep apnea    She has a history of migraines occasionally with episodes of vertigo.  She very occasionally uses diazepam for the vertigo.  She does not use diazepam for anxiety    Patient Active Problem List    Diagnosis Date  Noted    PMB (postmenopausal bleeding) 10/03/2024     Priority: Medium    Family history of malignant neoplasm of ovary 10/03/2024     Priority: Medium    Mixed hyperlipidemia 09/09/2024     Priority: Medium    Overweight (BMI 25.0-29.9) 09/09/2024     Priority: Medium    Perimenopause 09/09/2024     Priority: Medium    Snoring 09/09/2024     Priority: Medium    Ventral hernia without obstruction or gangrene 09/09/2024     Priority: Medium    Prediabetes 10/26/2022     Priority: Medium      Past Medical History:   Diagnosis Date    Gestational diabetes     Uncomplicated asthma      History reviewed. No pertinent surgical history.  Current Outpatient Medications   Medication Sig Dispense Refill    albuterol (PROAIR HFA/PROVENTIL HFA/VENTOLIN HFA) 108 (90 Base) MCG/ACT inhaler Inhale 2 puffs into the lungs every 6 hours 18 g 1    diazepam (VALIUM) 2 MG tablet Take 1 tablet by mouth 3 times daily. TAKING PRN      metFORMIN (GLUCOPHAGE) 500 MG tablet Two tablets in the morning and one tablet in the evening. 270 tablet 3       No Known Allergies     Social History     Tobacco Use    Smoking status: Never     Passive exposure: Never    Smokeless tobacco: Never   Substance Use Topics    Alcohol use: Yes     Comment: sometimes        History   Drug Use No             Review of Systems  CONSTITUTIONAL: NEGATIVE for fever, chills, change in weight  INTEGUMENTARY/SKIN: NEGATIVE for worrisome rashes, moles or lesions  EYES: NEGATIVE for vision changes or irritation  ENT/MOUTH: NEGATIVE for ear, mouth and throat problems  RESP: NEGATIVE for significant cough or SOB  CV: NEGATIVE for chest pain, palpitations or peripheral edema  GI: NEGATIVE for nausea, abdominal pain, heartburn, or change in bowel habits  : NEGATIVE for frequency, dysuria, or hematuria  MUSCULOSKELETAL: NEGATIVE for significant arthralgias or myalgia  NEURO: NEGATIVE for weakness, dizziness or paresthesias  ENDOCRINE: NEGATIVE for temperature intolerance,  "skin/hair changes  HEME: NEGATIVE for bleeding problems  PSYCHIATRIC: NEGATIVE for changes in mood or affect    Objective    /82   Pulse 74   Temp 98.3  F (36.8  C)   Resp 10   Ht 1.583 m (5' 2.32\")   Wt 77.3 kg (170 lb 6.4 oz)   LMP 08/01/2023 (Approximate)   SpO2 99%   BMI 30.84 kg/m     Estimated body mass index is 30.84 kg/m  as calculated from the following:    Height as of this encounter: 1.583 m (5' 2.32\").    Weight as of this encounter: 77.3 kg (170 lb 6.4 oz).  Physical Exam  GENERAL: alert and no distress  EYES: Eyes grossly normal to inspection, PERRL and conjunctivae and sclerae normal  HENT: ear canals and TM's normal, nose and mouth without ulcers or lesions  NECK: no adenopathy, no asymmetry, masses, or scars  RESP: lungs clear to auscultation - no rales, rhonchi or wheezes  CV: regular rate and rhythm, normal S1 S2, no S3 or S4, no murmur, click or rub, no peripheral edema  ABDOMEN: soft, nontender, no hepatosplenomegaly, no masses and bowel sounds normal  MS: no gross musculoskeletal defects noted, no edema  SKIN: no suspicious lesions or rashes  NEURO: Normal strength and tone, mentation intact and speech normal  PSYCH: mentation appears normal, affect normal/bright    Recent Labs   Lab Test 09/25/24  1632 06/11/24  0835    138   POTASSIUM 4.3 4.4   CR 0.91 0.99*   A1C  --  6.0*        Diagnostics  Labs pending at this time.  Results will be reviewed when available.  Recent Results (from the past 24 hours)   CBC with platelets    Collection Time: 11/18/24  3:39 PM   Result Value Ref Range    WBC Count 6.8 4.0 - 11.0 10e3/uL    RBC Count 4.04 3.80 - 5.20 10e6/uL    Hemoglobin 13.3 11.7 - 15.7 g/dL    Hematocrit 38.9 35.0 - 47.0 %    MCV 96 78 - 100 fL    MCH 32.9 26.5 - 33.0 pg    MCHC 34.2 31.5 - 36.5 g/dL    RDW 12.2 10.0 - 15.0 %    Platelet Count 315 150 - 450 10e3/uL   Hemoglobin A1c    Collection Time: 11/18/24  3:39 PM   Result Value Ref Range    Estimated Average Glucose " 120 (H) <117 mg/dL    Hemoglobin A1C 5.8 (H) 0.0 - 5.6 %      No EKG required, no history of coronary heart disease, significant arrhythmia, peripheral arterial disease or other structural heart disease.    Revised Cardiac Risk Index (RCRI)  The patient has the following serious cardiovascular risks for perioperative complications:   - No serious cardiac risks = 0 points     RCRI Interpretation: 0 points: Class I (very low risk - 0.4% complication rate)         Signed Electronically by: Rika Schwarz PA-C  A copy of this evaluation report is provided to the requesting physician.

## 2024-11-18 NOTE — PROGRESS NOTES
Preoperative Evaluation  [facility] [place]  [address], [address]  [place] [address]  Phone: [phone]  Fax: [phone]  Primary Provider: Marilynn Lozano MD  Pre-op Performing Provider: Rika Schwarz PA-C  Nov 18, 2024 11/18/2024   Surgical Information   What procedure is being done? D&C    Facility or [facility] where procedure/surgery [facility] be performed[facility]    Who is doing the procedure / surgery? Hailey Perera    Date of surgery / procedure: Dec 5 2024    Time of surgery / procedure: unknown    Where do you plan to recover after surgery? at home with family        Patient-reported     Fax number for surgical facility: Note does not need to be faxed, will be available electronically in Epic.    Assessment & Plan     The proposed surgical procedure is considered LOW risk.    Preop general physical exam  Patient is optimized for procedure as above  - CBC with platelets; Future  - CBC with platelets    PMB (postmenopausal bleeding)  Patient is scheduled for procedure as above sleep study    Snoring  She has upcoming appointment with sleep med to discuss     Prediabetes  She is taking metformin to manage this, her hemoglobin A1c is 5.8 and is improving.  - Basic metabolic panel  (Ca, Cl, CO2, Creat, Gluc, K, Na, BUN); Future  - Hemoglobin A1c; Future  - Basic metabolic panel  (Ca, Cl, CO2, Creat, Gluc, K, Na, BUN)  - Hemoglobin A1c    Mild intermittent asthma without complication  Well controlled       Possible Sleep Apnea:   she has upcoming appoint with sleep med as above         Risks and Recommendations  The patient has the following additional risks and recommendations for perioperative complications:  Obstructive Sleep Apnea:   Patient may have untreated sleep apnea is awaiting evaluation    Preoperative Medication Instructions  Antiplatelet or Anticoagulation Medication Instructions   - Patient is on no antiplatelet or anticoagulation  medications.    Additional Medication Instructions  Take all scheduled medications on the day of surgery EXCEPT for modifications listed below:   - metformin: DO NOT TAKE day of surgery.   - Herbal medications and vitamins: DO NOT TAKE 14 days prior to surgery.    Recommendation  Approval given to proceed with proposed procedure, without further diagnostic evaluation.    Tima Ocasio is a 54 year old, presenting for the following:  Pre-Op Exam          11/18/2024     2:49 PM   Additional Questions   Roomed by Nichelle SAEED   Accompanied by None         11/18/2024     2:49 PM   Patient Reported Additional Medications   Patient reports taking the following new medications NA     HPI related to upcoming procedure: She had been postmenopausal for approximately 1 year when she she had a few episodes of postmenopausal bleeding she was evaluated by gynecology and is scheduled for procedure as above        11/18/2024   Pre-Op Questionnaire   Have you ever had a heart attack or stroke? No    Have you ever had surgery on your heart or blood vessels, such as a stent placement, a coronary artery bypass, or surgery on an artery in your head, neck, heart, or legs? No    Do you have chest pain with activity? No    Do you have a history of heart failure? No    Do you currently have a cold, bronchitis or symptoms of other infection? No    Do you have a cough, shortness of breath, or wheezing? No    Do you or anyone in your family have previous history of blood clots? (!) YES dad may have had blood clots-- he has had cardiac stents, niece at age 24 had an embolic stroke   No personal history of clot   Do you or does anyone in your family have a serious bleeding problem such as prolonged bleeding following surgeries or cuts? No    Have you ever had problems with anemia or been told to take iron pills? (!) YES in her past, was turned away from giving blood in past     Have you had any abnormal blood loss such as black, tarry or  bloody stools, or abnormal vaginal bleeding? No    Have you ever had a blood transfusion? No    Are you willing to have a blood transfusion if it is medically needed before, during, or after your surgery? Yes    Have you or any of your relatives ever had problems with anesthesia? (!) UNKNOWN no personal history of anesthesia issues, she has not heard of any issues with anesthesia in her family    Do you have sleep apnea, excessive snoring or daytime drowsiness? (!) YES  She has initial appt set up for sleep study due to snoring   Do you have a CPAP machine? (!) NO has appt set up    Do you have any artifical heart valves or other implanted medical devices like a pacemaker, defibrillator, or continuous glucose monitor? No    Do you have artificial joints? No    Are you allergic to latex? No        Patient-reported     Health Care Directive  Patient does not have a Health Care Directive: Discussed advance care planning with patient; however, patient declined at this time.    Preoperative Review of    reviewed - controlled substances prescribed by other outside provider(s).      Status of Chronic Conditions:  ASTHMA - Patient has a longstanding history of moderate-severe Asthma . Patient has been doing well overall noting NO SYMPTOMS and continues on medication regimen consisting of albuterol as needed without adverse reactions or side effects.     Pre-DIABETES - Patient has a  history of DiabetesType prediabetes. Patient is being treated with diet, oral agents, and exercise and denies significant side effects. Control has been good, in fact improving. Complicating factors include but are not limited to: none.     Snoring-she does have upcoming evaluation with sleep med to evaluate her for sleep apnea    She has a history of migraines occasionally with episodes of vertigo.  She very occasionally uses diazepam for the vertigo.  She does not use diazepam for anxiety    Patient Active Problem List    Diagnosis Date  Noted    PMB (postmenopausal bleeding) 10/03/2024     Priority: Medium    Family history of malignant neoplasm of ovary 10/03/2024     Priority: Medium    Mixed hyperlipidemia 09/09/2024     Priority: Medium    Overweight (BMI 25.0-29.9) 09/09/2024     Priority: Medium    Perimenopause 09/09/2024     Priority: Medium    Snoring 09/09/2024     Priority: Medium    Ventral hernia without obstruction or gangrene 09/09/2024     Priority: Medium    Prediabetes 10/26/2022     Priority: Medium      Past Medical History:   Diagnosis Date    Gestational diabetes     Uncomplicated asthma      History reviewed. No pertinent surgical history.  Current Outpatient Medications   Medication Sig Dispense Refill    albuterol (PROAIR HFA/PROVENTIL HFA/VENTOLIN HFA) 108 (90 Base) MCG/ACT inhaler Inhale 2 puffs into the lungs every 6 hours 18 g 1    diazepam (VALIUM) 2 MG tablet Take 1 tablet by mouth 3 times daily. TAKING PRN      metFORMIN (GLUCOPHAGE) 500 MG tablet Two tablets in the morning and one tablet in the evening. 270 tablet 3       No Known Allergies     Social History     Tobacco Use    Smoking status: Never     Passive exposure: Never    Smokeless tobacco: Never   Substance Use Topics    Alcohol use: Yes     Comment: sometimes        History   Drug Use No             Review of Systems  CONSTITUTIONAL: NEGATIVE for fever, chills, change in weight  INTEGUMENTARY/SKIN: NEGATIVE for worrisome rashes, moles or lesions  EYES: NEGATIVE for vision changes or irritation  ENT/MOUTH: NEGATIVE for ear, mouth and throat problems  RESP: NEGATIVE for significant cough or SOB  CV: NEGATIVE for chest pain, palpitations or peripheral edema  GI: NEGATIVE for nausea, abdominal pain, heartburn, or change in bowel habits  : NEGATIVE for frequency, dysuria, or hematuria  MUSCULOSKELETAL: NEGATIVE for significant arthralgias or myalgia  NEURO: NEGATIVE for weakness, dizziness or paresthesias  ENDOCRINE: NEGATIVE for temperature intolerance,  "skin/hair changes  HEME: NEGATIVE for bleeding problems  PSYCHIATRIC: NEGATIVE for changes in mood or affect    Objective    /82   Pulse 74   Temp 98.3  F (36.8  C)   Resp 10   Ht 1.583 m (5' 2.32\")   Wt 77.3 kg (170 lb 6.4 oz)   LMP 08/01/2023 (Approximate)   SpO2 99%   BMI 30.84 kg/m     Estimated body mass index is 30.84 kg/m  as calculated from the following:    Height as of this encounter: 1.583 m (5' 2.32\").    Weight as of this encounter: 77.3 kg (170 lb 6.4 oz).  Physical Exam  GENERAL: alert and no distress  EYES: Eyes grossly normal to inspection, PERRL and conjunctivae and sclerae normal  HENT: ear canals and TM's normal, nose and mouth without ulcers or lesions  NECK: no adenopathy, no asymmetry, masses, or scars  RESP: lungs clear to auscultation - no rales, rhonchi or wheezes  CV: regular rate and rhythm, normal S1 S2, no S3 or S4, no murmur, click or rub, no peripheral edema  ABDOMEN: soft, nontender, no hepatosplenomegaly, no masses and bowel sounds normal  MS: no gross musculoskeletal defects noted, no edema  SKIN: no suspicious lesions or rashes  NEURO: Normal strength and tone, mentation intact and speech normal  PSYCH: mentation appears normal, affect normal/bright    Recent Labs   Lab Test 09/25/24  1632 06/11/24  0835    138   POTASSIUM 4.3 4.4   CR 0.91 0.99*   A1C  --  6.0*        Diagnostics  Labs pending at this time.  Results will be reviewed when available.  Recent Results (from the past 24 hours)   CBC with platelets    Collection Time: 11/18/24  3:39 PM   Result Value Ref Range    WBC Count 6.8 4.0 - 11.0 10e3/uL    RBC Count 4.04 3.80 - 5.20 10e6/uL    Hemoglobin 13.3 11.7 - 15.7 g/dL    Hematocrit 38.9 35.0 - 47.0 %    MCV 96 78 - 100 fL    MCH 32.9 26.5 - 33.0 pg    MCHC 34.2 31.5 - 36.5 g/dL    RDW 12.2 10.0 - 15.0 %    Platelet Count 315 150 - 450 10e3/uL   Hemoglobin A1c    Collection Time: 11/18/24  3:39 PM   Result Value Ref Range    Estimated Average Glucose " 120 (H) <117 mg/dL    Hemoglobin A1C 5.8 (H) 0.0 - 5.6 %      No EKG required, no history of coronary heart disease, significant arrhythmia, peripheral arterial disease or other structural heart disease.    Revised Cardiac Risk Index (RCRI)  The patient has the following serious cardiovascular risks for perioperative complications:   - No serious cardiac risks = 0 points     RCRI Interpretation: 0 points: Class I (very low risk - 0.4% complication rate)         Signed Electronically by: Rika Schwarz PA-C  A copy of this evaluation report is provided to the requesting physician.

## 2024-11-18 NOTE — PATIENT INSTRUCTIONS
How to Take Your Medication Before Surgery  Preoperative Medication Instructions   Antiplatelet or Anticoagulation Medication Instructions   - Patient is on no antiplatelet or anticoagulation medications.    Additional Medication Instructions  Take all scheduled medications on the day of surgery EXCEPT for modifications listed below:   - metformin: DO NOT TAKE day of surgery.   - Herbal medications and vitamins: DO NOT TAKE 14 days prior to surgery.       Patient Education   Preparing for Your Surgery  For Adults  Getting started  In most cases, a nurse will call to review your health history and instructions. They will give you an arrival time based on your scheduled surgery time. Please be ready to share:  Your doctor's clinic name and phone number  Your medical, surgical, and anesthesia history  A list of allergies and sensitivities  A list of medicines, including herbal treatments and over-the-counter drugs  Whether the patient has a legal guardian (ask how to send us the papers in advance)  Note: You may not receive a call if you were seen at our PAC (Preoperative Assessment Center).  Please tell us if you're pregnant--or if there's any chance you might be pregnant. Some surgeries may injure a fetus (unborn baby), so they require a pregnancy test. Surgeries that are safe for a fetus don't always need a test, and you can choose whether to have one.   Preparing for surgery  Within 10 to 30 days of surgery: Have a pre-op exam (sometimes called an H&P, or History and Physical). This can be done at a clinic or pre-operative center.  If you're having a , you may not need this exam. Talk to your care team.  At your pre-op exam, talk to your care team about all medicines you take. (This includes CBD oil and any drugs, such as THC, marijuana, and other forms of cannabis.) If you need to stop any medicine before surgery, ask when to start taking it again.  This is for your safety. Many medicines and drugs can  make you bleed too much during surgery. Some change how well surgery (anesthesia) drugs work.  Call your insurance company to let them know you're having surgery. (If you don't have insurance, call [phone].)  Call your clinic if there's any change in your health. This includes a scrape or scratch near the surgery site, or any signs of a cold (sore throat, runny nose, cough, rash, fever).  Eating and drinking guidelines  For your safety: Unless your surgeon tells you otherwise, follow the guidelines below.  Eat and drink as normal until 8 hours before you arrive for surgery. After that, no food or milk. You can spit out gum when you arrive.  Drink clear liquids until 2 hours before you arrive. These are liquids you can see through, like water, Gatorade, and Propel Water. They also include plain black coffee and tea (no cream or milk).  No alcohol for 24 hours before you arrive. The night before surgery, stop any drinks that contain THC.  If your care team tells you to take medicine on the morning of surgery, it's okay to take it with a sip of water. No other medicines or drugs are allowed (including CBD oil)--follow your care team's instructions.  If you have questions the day of surgery, call your hospital or surgery center.   Preventing infection  Shower or bathe the night before and the morning of surgery. Follow the instructions your clinic gave you. (If no instructions, use regular soap.)  Don't shave or clip hair near your surgery site. We'll remove the hair if needed.  Don't smoke or vape the morning of surgery. No chewing tobacco for 6 hours before you arrive. A nicotine patch is okay. You may spit out nicotine gum when you arrive.  For some surgeries, the surgeon will tell you to fully quit smoking and nicotine.  We will make every effort to keep you safe from infection. We will:  Clean our hands often with soap and water (or an alcohol-based hand rub).  Clean the skin at your surgery site with a  special soap that kills germs.  Give you a special gown to keep you warm. (Cold raises the risk of infection.)  Wear hair covers, masks, gowns, and gloves during surgery.  Give antibiotic medicine, if prescribed. Not all surgeries need this medicine.  What to bring on the day of surgery  Photo ID and insurance card  Copy of your health care directive, if you have one  Glasses and hearing aids (bring cases)  You can't wear contacts during surgery  Inhaler and eye drops, if you use them (tell us about these when you arrive)  CPAP machine or breathing device, if you use them  A few personal items, if spending the night  If you have . . .  A pacemaker, ICD (cardiac defibrillator), or other implant: Bring the ID card.  An implanted stimulator: Bring the remote control.  A legal guardian: Bring a copy of the certified (court-stamped) guardianship papers.  Please remove any jewelry, including body piercings. Leave jewelry and other valuables at home.  If you're going home the day of surgery  You must have a responsible adult drive you home. They should stay with you overnight as well.  If you don't have someone to stay with you, and you aren't safe to go home alone, we may keep you overnight. Insurance often won't pay for this.  After surgery  If it's hard to control your pain or you need more pain medicine, please call your surgeon's office.  Questions?   If you have any questions for your care team, list them here:   ____________________________________________________________________________________________________________________________________________________________________________________________________________________________________________________________  For informational purposes only. Not to replace the advice of your health care provider. Copyright   2003, 2019 [facility]. All rights reserved. Clinically reviewed by Hunter Yañez MD. [employer] [id number] - REV 08/24.

## 2024-11-19 LAB
ANION GAP SERPL CALCULATED.3IONS-SCNC: 9 MMOL/L (ref 7–15)
BUN SERPL-MCNC: 11.6 MG/DL (ref 6–20)
CALCIUM SERPL-MCNC: 9.7 MG/DL (ref 8.8–10.4)
CHLORIDE SERPL-SCNC: 99 MMOL/L (ref 98–107)
CREAT SERPL-MCNC: 0.86 MG/DL (ref 0.51–0.95)
EGFRCR SERPLBLD CKD-EPI 2021: 80 ML/MIN/1.73M2
GLUCOSE SERPL-MCNC: 92 MG/DL (ref 70–99)
HCO3 SERPL-SCNC: 28 MMOL/L (ref 22–29)
POTASSIUM SERPL-SCNC: 4.4 MMOL/L (ref 3.4–5.3)
SODIUM SERPL-SCNC: 136 MMOL/L (ref 135–145)

## 2024-12-03 ENCOUNTER — ANESTHESIA EVENT (OUTPATIENT)
Dept: SURGERY | Facility: AMBULATORY SURGERY CENTER | Age: 54
End: 2024-12-03
Payer: COMMERCIAL

## 2024-12-03 RX ORDER — DEXAMETHASONE SODIUM PHOSPHATE 4 MG/ML
4 INJECTION, SOLUTION INTRA-ARTICULAR; INTRALESIONAL; INTRAMUSCULAR; INTRAVENOUS; SOFT TISSUE
OUTPATIENT
Start: 2024-12-03

## 2024-12-03 RX ORDER — HYDRALAZINE HYDROCHLORIDE 20 MG/ML
2.5-5 INJECTION INTRAMUSCULAR; INTRAVENOUS EVERY 10 MIN PRN
OUTPATIENT
Start: 2024-12-03

## 2024-12-03 RX ORDER — ONDANSETRON 4 MG/1
4 TABLET, ORALLY DISINTEGRATING ORAL EVERY 30 MIN PRN
OUTPATIENT
Start: 2024-12-03

## 2024-12-03 RX ORDER — NALOXONE HYDROCHLORIDE 0.4 MG/ML
0.1 INJECTION, SOLUTION INTRAMUSCULAR; INTRAVENOUS; SUBCUTANEOUS
OUTPATIENT
Start: 2024-12-03

## 2024-12-03 RX ORDER — OXYCODONE HYDROCHLORIDE 5 MG/1
10 TABLET ORAL EVERY 4 HOURS PRN
OUTPATIENT
Start: 2024-12-03

## 2024-12-03 RX ORDER — SODIUM CHLORIDE, SODIUM LACTATE, POTASSIUM CHLORIDE, CALCIUM CHLORIDE 600; 310; 30; 20 MG/100ML; MG/100ML; MG/100ML; MG/100ML
INJECTION, SOLUTION INTRAVENOUS CONTINUOUS
OUTPATIENT
Start: 2024-12-03

## 2024-12-03 RX ORDER — FENTANYL CITRATE 50 UG/ML
25 INJECTION, SOLUTION INTRAMUSCULAR; INTRAVENOUS EVERY 5 MIN PRN
OUTPATIENT
Start: 2024-12-03

## 2024-12-03 RX ORDER — OXYCODONE HYDROCHLORIDE 5 MG/1
5 TABLET ORAL EVERY 4 HOURS PRN
OUTPATIENT
Start: 2024-12-03

## 2024-12-03 RX ORDER — FENTANYL CITRATE 50 UG/ML
50 INJECTION, SOLUTION INTRAMUSCULAR; INTRAVENOUS EVERY 5 MIN PRN
OUTPATIENT
Start: 2024-12-03

## 2024-12-03 RX ORDER — ONDANSETRON 2 MG/ML
4 INJECTION INTRAMUSCULAR; INTRAVENOUS EVERY 30 MIN PRN
OUTPATIENT
Start: 2024-12-03

## 2024-12-03 RX ORDER — FENTANYL CITRATE 50 UG/ML
25 INJECTION, SOLUTION INTRAMUSCULAR; INTRAVENOUS
OUTPATIENT
Start: 2024-12-03

## 2024-12-03 RX ORDER — METOPROLOL TARTRATE 1 MG/ML
1-2 INJECTION, SOLUTION INTRAVENOUS EVERY 5 MIN PRN
OUTPATIENT
Start: 2024-12-03

## 2024-12-03 NOTE — ANESTHESIA PREPROCEDURE EVALUATION
"Anesthesia Pre-Procedure Evaluation    Patient: Ninfa Simons   MRN: [id number] : 1970        Procedure : Procedure(s):  Hysteroscopy dilation and curettage          Past Medical History:   Diagnosis Date    Gestational diabetes     Uncomplicated asthma       No past surgical history on file.   No Known Allergies   Social History     Tobacco Use    Smoking status: Never     Passive exposure: Never    Smokeless tobacco: Never   Substance Use Topics    Alcohol use: Yes     Comment: sometimes       Wt Readings from Last 1 Encounters:   24 77.3 kg (170 lb 6.4 oz)           Physical Exam    Airway        Mallampati: II   TM distance: > 3 FB   Neck ROM: full   Mouth opening: > 3 cm    Respiratory Devices and Support         Dental       (+) Modest Abnormalities - crowns, retainers, 1 or 2 missing teeth      Cardiovascular   cardiovascular exam normal          Pulmonary   pulmonary exam normal                OUTSIDE LABS:  CBC:   Lab Results   Component Value Date    WBC 6.8 2024    WBC 7.8 2020    HGB 13.3 2024    HGB 13.7 2020    HCT 38.9 2024    HCT 40.7 2020     2024     2020     BMP:   Lab Results   Component Value Date     2024     2024    POTASSIUM 4.4 2024    POTASSIUM 4.3 2024    CHLORIDE 99 2024    CHLORIDE 103 2024    CO2 28 2024    CO2 25 2024    BUN 11.6 2024    BUN 16.7 2024    CR 0.86 2024    CR 0.91 2024    GLC 92 2024    GLC 85 2024     COAGS: No results found for: \"PTT\", \"INR\", \"FIBR\"  POC: No results found for: \"BGM\", \"HCG\", \"HCGS\"  HEPATIC:   Lab Results   Component Value Date    ALBUMIN 4.5 10/26/2023    PROTTOTAL 7.4 10/26/2023    ALT 19 10/26/2023    AST 21 10/26/2023    ALKPHOS 52 10/26/2023    BILITOTAL 0.4 10/26/2023     OTHER:   Lab Results   Component Value Date    A1C 5.8 (H) 2024    PATRICIA 9.7 2024    TSH 2.00 " "10/28/2022       Anesthesia Plan    ASA Status:  2    NPO Status:  NPO Appropriate    Anesthesia Type: MAC.     - Reason for MAC: straight local not clinically adequate   Induction: Intravenous, Propofol.   Maintenance: TIVA.        Consents    Anesthesia Plan(s) and associated risks, benefits, and realistic alternatives discussed. Questions answered and patient/representative(s) expressed understanding.     - Discussed: Risks, Benefits and Alternatives for BOTH SEDATION and the PROCEDURE were discussed     - Discussed with:  Patient      - Extended Intubation/Ventilatory Support Discussed: No.      - Patient is DNR/DNI Status: No     Use of blood products discussed: No .     Postoperative Care    Pain management: IV analgesics, Oral pain medications, Multi-modal analgesia.   PONV prophylaxis: Dexamethasone or Solumedrol, Ondansetron (or other 5HT-3), Background Propofol Infusion     Comments:               Celestino Bhagat MD    I have reviewed the pertinent notes and labs in the chart from the past 30 days and (re)examined the patient.  Any updates or changes from those notes are reflected in this note.                         # Obesity: Estimated body mass index is 30.84 kg/m  as calculated from the following:    Height as of 11/18/24: 1.583 m (5' 2.32\").    Weight as of 11/18/24: 77.3 kg (170 lb 6.4 oz).             "

## 2024-12-05 ENCOUNTER — HOSPITAL ENCOUNTER (OUTPATIENT)
Facility: AMBULATORY SURGERY CENTER | Age: 54
End: 2024-12-05
Attending: OBSTETRICS & GYNECOLOGY | Admitting: OBSTETRICS & GYNECOLOGY
Payer: COMMERCIAL

## 2024-12-05 ENCOUNTER — ANESTHESIA (OUTPATIENT)
Dept: SURGERY | Facility: AMBULATORY SURGERY CENTER | Age: 54
End: 2024-12-05
Payer: COMMERCIAL

## 2024-12-05 VITALS
HEART RATE: 74 BPM | OXYGEN SATURATION: 93 % | BODY MASS INDEX: 30.84 KG/M2 | WEIGHT: 170.4 LBS | RESPIRATION RATE: 16 BRPM | DIASTOLIC BLOOD PRESSURE: 61 MMHG | SYSTOLIC BLOOD PRESSURE: 101 MMHG | TEMPERATURE: 97.6 F

## 2024-12-05 DIAGNOSIS — N84.0 ENDOMETRIAL POLYP: Primary | ICD-10-CM

## 2024-12-05 RX ORDER — IBUPROFEN 800 MG/1
800 TABLET, FILM COATED ORAL EVERY 6 HOURS PRN
Qty: 30 TABLET | Refills: 0 | Status: SHIPPED | OUTPATIENT
Start: 2024-12-05

## 2024-12-05 RX ORDER — PROPOFOL 10 MG/ML
INJECTION, EMULSION INTRAVENOUS CONTINUOUS PRN
Status: DISCONTINUED | OUTPATIENT
Start: 2024-12-05 | End: 2024-12-05

## 2024-12-05 RX ORDER — KETOROLAC TROMETHAMINE 30 MG/ML
INJECTION, SOLUTION INTRAMUSCULAR; INTRAVENOUS PRN
Status: DISCONTINUED | OUTPATIENT
Start: 2024-12-05 | End: 2024-12-05

## 2024-12-05 RX ORDER — HYDROCODONE BITARTRATE AND ACETAMINOPHEN 5; 325 MG/1; MG/1
1-2 TABLET ORAL EVERY 4 HOURS PRN
Qty: 3 TABLET | Refills: 0 | Status: SHIPPED | OUTPATIENT
Start: 2024-12-05

## 2024-12-05 RX ORDER — ACETAMINOPHEN 325 MG/1
975 TABLET ORAL ONCE
OUTPATIENT
Start: 2024-12-05 | End: 2024-12-05

## 2024-12-05 RX ORDER — FENTANYL CITRATE 50 UG/ML
INJECTION, SOLUTION INTRAMUSCULAR; INTRAVENOUS PRN
Status: DISCONTINUED | OUTPATIENT
Start: 2024-12-05 | End: 2024-12-05

## 2024-12-05 RX ORDER — SODIUM CHLORIDE, SODIUM LACTATE, POTASSIUM CHLORIDE, CALCIUM CHLORIDE 600; 310; 30; 20 MG/100ML; MG/100ML; MG/100ML; MG/100ML
INJECTION, SOLUTION INTRAVENOUS CONTINUOUS
Status: DISPENSED | OUTPATIENT
Start: 2024-12-05

## 2024-12-05 RX ORDER — ONDANSETRON 2 MG/ML
INJECTION INTRAMUSCULAR; INTRAVENOUS PRN
Status: DISCONTINUED | OUTPATIENT
Start: 2024-12-05 | End: 2024-12-05

## 2024-12-05 RX ORDER — OXYCODONE HYDROCHLORIDE 5 MG/1
5 TABLET ORAL
OUTPATIENT
Start: 2024-12-05

## 2024-12-05 RX ORDER — LIDOCAINE 40 MG/G
CREAM TOPICAL
Status: DISPENSED | OUTPATIENT
Start: 2024-12-05

## 2024-12-05 RX ORDER — IBUPROFEN 400 MG/1
800 TABLET, FILM COATED ORAL ONCE
OUTPATIENT
Start: 2024-12-05 | End: 2024-12-05

## 2024-12-05 RX ORDER — PROPOFOL 10 MG/ML
INJECTION, EMULSION INTRAVENOUS PRN
Status: DISCONTINUED | OUTPATIENT
Start: 2024-12-05 | End: 2024-12-05

## 2024-12-05 RX ORDER — ACETAMINOPHEN 325 MG/1
975 TABLET ORAL ONCE
Status: COMPLETED | OUTPATIENT
Start: 2024-12-05 | End: 2024-12-05

## 2024-12-05 RX ORDER — LIDOCAINE HYDROCHLORIDE 20 MG/ML
INJECTION, SOLUTION INFILTRATION; PERINEURAL PRN
Status: DISCONTINUED | OUTPATIENT
Start: 2024-12-05 | End: 2024-12-05

## 2024-12-05 RX ORDER — ACETAMINOPHEN 325 MG/1
975 TABLET ORAL ONCE
Status: ACTIVE | OUTPATIENT
Start: 2024-12-05

## 2024-12-05 RX ADMIN — FENTANYL CITRATE 25 MCG: 50 INJECTION, SOLUTION INTRAMUSCULAR; INTRAVENOUS at 12:52

## 2024-12-05 RX ADMIN — ACETAMINOPHEN 975 MG: 325 TABLET ORAL at 12:13

## 2024-12-05 RX ADMIN — PROPOFOL 150 MCG/KG/MIN: 10 INJECTION, EMULSION INTRAVENOUS at 12:33

## 2024-12-05 RX ADMIN — ONDANSETRON 4 MG: 2 INJECTION INTRAMUSCULAR; INTRAVENOUS at 12:51

## 2024-12-05 RX ADMIN — KETOROLAC TROMETHAMINE 30 MG: 30 INJECTION, SOLUTION INTRAMUSCULAR; INTRAVENOUS at 12:53

## 2024-12-05 RX ADMIN — FENTANYL CITRATE 50 MCG: 50 INJECTION, SOLUTION INTRAMUSCULAR; INTRAVENOUS at 12:26

## 2024-12-05 RX ADMIN — PROPOFOL 60 MG: 10 INJECTION, EMULSION INTRAVENOUS at 12:33

## 2024-12-05 RX ADMIN — SODIUM CHLORIDE, SODIUM LACTATE, POTASSIUM CHLORIDE, CALCIUM CHLORIDE: 600; 310; 30; 20 INJECTION, SOLUTION INTRAVENOUS at 12:22

## 2024-12-05 RX ADMIN — FENTANYL CITRATE 25 MCG: 50 INJECTION, SOLUTION INTRAMUSCULAR; INTRAVENOUS at 12:40

## 2024-12-05 RX ADMIN — LIDOCAINE HYDROCHLORIDE 40 MG: 20 INJECTION, SOLUTION INFILTRATION; PERINEURAL at 12:33

## 2024-12-05 NOTE — ANESTHESIA POSTPROCEDURE EVALUATION
Patient: Ninfa Simons    Procedure: Procedure(s):  Hysteroscopy dilation and curettage with myosure       Anesthesia Type:  MAC    Note:  Disposition: Outpatient   Postop Pain Control: Uneventful            Sign Out: Well controlled pain   PONV: No   Neuro/Psych: Uneventful            Sign Out: Acceptable/Baseline neuro status   Airway/Respiratory: Uneventful            Sign Out: Acceptable/Baseline resp. status   CV/Hemodynamics: Uneventful            Sign Out: Acceptable CV status; No obvious hypovolemia; No obvious fluid overload   Other NRE:    DID A NON-ROUTINE EVENT OCCUR?            Last vitals:  Vitals Value Taken Time   /73 12/05/24 1315   Temp     Pulse 71 12/05/24 1315   Resp 16 12/05/24 1304   SpO2 94 % 12/05/24 1326   Vitals shown include unfiled device data.    Electronically Signed By: Celestino Bhagat MD  December 5, 2024  1:35 PM

## 2024-12-05 NOTE — ANESTHESIA CARE TRANSFER NOTE
Patient: Ninfa Simons    Procedure: Procedure(s):  Hysteroscopy dilation and curettage with myosure       Diagnosis: PMB (postmenopausal bleeding) [N95.0]  Diagnosis Additional Information: No value filed.    Anesthesia Type:   MAC     Note:    Oropharynx: oropharynx clear of all foreign objects and spontaneously breathing  Level of Consciousness: awake and drowsy  Oxygen Supplementation: room air    Independent Airway: airway patency satisfactory and stable  Dentition: dentition unchanged  Vital Signs Stable: post-procedure vital signs reviewed and stable  Report to RN Given: handoff report given  Patient transferred to: PACU    Handoff Report: Identifed the Patient, Identified the Reponsible Provider, Reviewed the pertinent medical history, Discussed the surgical course, Reviewed Intra-OP anesthesia mangement and issues during anesthesia, Set expectations for post-procedure period and Allowed opportunity for questions and acknowledgement of understanding      Vitals:  Vitals Value Taken Time   /61 12/05/24 1304   Temp 98.7    Pulse 74 12/05/24 1304   Resp 16 12/05/24 1304   SpO2 95 % 12/05/24 1305   Vitals shown include unfiled device data.    Electronically Signed By: MEENA Dyer CRNA  December 5, 2024  1:07 PM

## 2024-12-05 NOTE — DISCHARGE INSTRUCTIONS
Tylenol 975 mg was given at 12:15 PM. Next dose available after 6:15 PM.    You should not take more then 4,000 mg of tylenol/acetaminophen in a 24 hour period.

## 2024-12-05 NOTE — INTERVAL H&P NOTE
I discussed risks, benefits, and complications of surgery including but not limited to bleeding, infection, damage to nearby organs including but not limited to bladder, bowel, ureters, nerves, and blood vessels as well as anesthesia risks and uterine perforation.  Injury may result at the time of surgery or in a separate procedure.  We also discussed the possibility of a reoperation if the pathology came back abnormal.  All questions answered, and accepting these risks, the patient elects to proceed with the procedure.        Hailey Perera, DO

## 2024-12-05 NOTE — OP NOTE
OPERATIVE REPORT:    Date of Procedure:  12/5/2024    Preoperative Diagnosis:  Post menopausal bleeding    Postoperative Diagnosis:  Same, endometrial polyp    Procedures:  Hysteroscopy, D&C, endometrial polypectomy with Myosure    Surgeon:  Hailey Perera    Assist:  OR staff    Anesthesia:  MAC    Specimens:    Endometrial curettings, endometrial polyp    Complications:   None    Findings/Conclusions:   Non-proliferative endometrium, small endometrial polyp seen    Estimated Blood Loss:  Minimal    Condition on discharge from OR:  Satisfactory      Procedure in Detail:  Proper consents were obtained.  The patient and I reviewed the risks, benefits, goals and limitations.  Her questions seemed to be answered.  After consenting to the procedure, the patient was taken to the OR where she was placed into the supine position.  She was then placed under MAC anesthesia after which she was placed into the dorsal lithotomy position.  She was then prepped and draped into the usual sterile fashion.  The speculum was placed and the cervix was grasped with an Allis Clamp. The hysteroscope was inserted in through the cervix with the findings as noted above.  The Myosure Reach device was inserted into the uterus and the endometrial polyp was removed and sent to pathology.  Everything appeared hemostatic.  The hysteroscope was removed.  Sharp curettage of the uterus was performed in a circumferential manor until a gritty texture was noted.  The curettage started at the 12 o'clock position and each pass was brought out, from each of the hours of the clock.  The cornua and the fundus were curetted.  The endometrial curettings were sent to pathology.  The Allis clamp was removed and hemostasis was noted.  Everything was removed from the patient's vagina.  The patient was then taken out of the dorsal lithotomy position, awakened, and taken to the recovery room in stable condition.    No apparent complications.  Counts were  correct.   I spoke to the patient's cousin after surgery and all questions were answered.      Hailey Perera

## 2024-12-20 PROBLEM — Z98.890 STATUS POST HYSTEROSCOPIC POLYPECTOMY: Status: ACTIVE | Noted: 2024-12-20

## 2025-01-27 ASSESSMENT — SLEEP AND FATIGUE QUESTIONNAIRES
HOW LIKELY ARE YOU TO NOD OFF OR FALL ASLEEP WHILE WATCHING TV: SLIGHT CHANCE OF DOZING
HOW LIKELY ARE YOU TO NOD OFF OR FALL ASLEEP WHILE SITTING AND READING: MODERATE CHANCE OF DOZING
HOW LIKELY ARE YOU TO NOD OFF OR FALL ASLEEP WHILE SITTING QUIETLY AFTER LUNCH WITHOUT ALCOHOL: WOULD NEVER DOZE
HOW LIKELY ARE YOU TO NOD OFF OR FALL ASLEEP WHEN YOU ARE A PASSENGER IN A CAR FOR AN HOUR WITHOUT A BREAK: SLIGHT CHANCE OF DOZING
HOW LIKELY ARE YOU TO NOD OFF OR FALL ASLEEP WHILE SITTING INACTIVE IN A PUBLIC PLACE: WOULD NEVER DOZE
HOW LIKELY ARE YOU TO NOD OFF OR FALL ASLEEP WHILE LYING DOWN TO REST IN THE AFTERNOON WHEN CIRCUMSTANCES PERMIT: SLIGHT CHANCE OF DOZING
HOW LIKELY ARE YOU TO NOD OFF OR FALL ASLEEP IN A CAR, WHILE STOPPED FOR A FEW MINUTES IN TRAFFIC: WOULD NEVER DOZE
HOW LIKELY ARE YOU TO NOD OFF OR FALL ASLEEP WHILE SITTING AND TALKING TO SOMEONE: WOULD NEVER DOZE

## 2025-01-28 ENCOUNTER — OFFICE VISIT (OUTPATIENT)
Dept: SLEEP MEDICINE | Facility: CLINIC | Age: 55
End: 2025-01-28
Attending: INTERNAL MEDICINE
Payer: COMMERCIAL

## 2025-01-28 VITALS
DIASTOLIC BLOOD PRESSURE: 84 MMHG | WEIGHT: 167 LBS | OXYGEN SATURATION: 97 % | SYSTOLIC BLOOD PRESSURE: 128 MMHG | BODY MASS INDEX: 29.59 KG/M2 | HEART RATE: 82 BPM | HEIGHT: 63 IN

## 2025-01-28 DIAGNOSIS — R06.83 SNORING: ICD-10-CM

## 2025-01-28 DIAGNOSIS — R53.82 CHRONIC FATIGUE: Primary | ICD-10-CM

## 2025-01-28 PROCEDURE — 99205 OFFICE O/P NEW HI 60 MIN: CPT | Performed by: INTERNAL MEDICINE

## 2025-01-28 NOTE — NURSING NOTE
"Chief Complaint   Patient presents with    Sleep Problem     Snoring, coughing, talking in my sleep. Wake between 3-5 and takes a while to fall back to sleep       Initial /84   Pulse 82   Ht 1.588 m (5' 2.5\")   Wt 75.8 kg (167 lb)   LMP 08/01/2023 (Approximate)   SpO2 97%   BMI 30.06 kg/m   Estimated body mass index is 30.06 kg/m  as calculated from the following:    Height as of this encounter: 1.588 m (5' 2.5\").    Weight as of this encounter: 75.8 kg (167 lb).    Medication Reconciliation: complete  ESS 5  Neck circumference: 36 centimeters.  Shena Nair MA   "

## 2025-01-28 NOTE — PROGRESS NOTES
[facility]   Outpatient Sleep Medicine Consultation  January 28, 2025      Name: Ninfa Simons MRN# [id number]   Age: 54 year old YOB: 1970     Date of Consultation: January 28, 2025  Consultation is requested by: Marilynn Lozano MD  [address] [place] [address] Marilynn Lozano  Primary care provider: Marilynn Lozano         Reason for Sleep Consult:     Ninfa Simons is a 54 year old female for complaints of  loud snoring and non-restorative sleep for several years         Assessment and Plan:     Summary Sleep Diagnoses:    Suspected WILVER/Loud snoring  High stop bang score of 3 and very typical symptoms. We discussed the pathophysiology of WILVER including the cardio-neurologic complications of significant untreated sleep apnea as well as the treatment options including CPAP, oral appliances and hypoglossal nerve stimulator.  She will be a good candidate for home sleep study due to the high pre-test probability.      Delayed sleep phase disorder  This may be causing some of the non-restorative sleep and insomnia particularly during the week/work days. She has the luxury of a job that has a relatively start time in the late mornings. We discussed the potential use of melatonin and light therapy if we will need to advance her sleep phase particularly if she gets a new job.         Summary Recommendations:      Orders Placed This Encounter   Procedures    HST-Home Sleep Apnea Test - Noxturnal Returnable       Summary Counseling:  See instructions    Counseling included a comprehensive review of diagnostic and therapeutic strategies as well as risks of inadequate therapy.  Educational materials provided in instructions.             History of Present Illness:   I had the pleasure of seeing Ninfa Simons is a 54 year old female with a history of asthma and gestational diabetes and presents with symptoms of loud snoring, nocturnal  "coughing and non-restorative sleep.   She reports \"snoring and coughing nightly. It can be hard for me to fall asleep on some nights of the week, and most nights of the week I wake up at least one time. I don t feeI I get good, refreshing, deep sleep on most nights, if at all\"    This has been ongoing for a while now and worse in the last couple years. She denies any witnessed apneas nor nocturnal gasping. She has no symptoms suggestive of RLS nor central hypersomnia. Weight has been stable.         SLEEP-WAKE SCHEDULE:     Ninfa Simons     -Describes themself as a night person;  prefer to go to sleep at 1:00 AM and wake up at 8:00 AM.     -Naps - None    Ninfa Simons    -ON WEEKDAYS, goes to sleep at 12:30 AM during the week; awakens  9:00 AM without an alarm; falls asleep in 60 minutes; has difficulty falling asleep.     -ON WEEKENDS, goes to sleep at 3:00 AM.  He wakes up at 11:00 AM without an alarm; falls asleep in 30 minutes.       -Awakens 1-2 times a night for 20 minutes before falling back to sleep; awakens to uncertain reasons        SCALES       SLEEP APNEA: Stopbang score- 3         INSOMNIA:  Insomnia severity score: 15       SLEEPINESS: [place] sleepiness scale:5  [normal < 11]   Drowsy driving/near accidents - None  Consequences: None       PHQ9:          No data to display                 SLEEP COMPLAINTS:  Cardio-respiratory    Snoring- 7/week  Dyspnea- denies  Morning headaches or confusion-denies  Coexisting Lung disease: denies    Coexisting Heart disease: denies    Does patient have a bed partner: denies  Has bed partner been sleeping separately because of snoring:  denies            RLS Screen: When you try to relax in the evening or sleep at  night, do you ever have unpleasant, restless feelings in your  legs that can be relieved by walking or movement? denies    Periodic limb movement: denies    Narcolepsy:  denies sudden urges of sleep attacks  Cataplexy:  denies   Sleep paralysis: "  denies    Hallucinations:   denies       Sleep Behaviors:  Leg symptoms/movements: denies  Motor restlessness:denies  Night terrors: denies  Bruxism: denies  Automatic behaviors: none    Other subjective complaints:  Anxiety or rumination denies  Pain and discomfort at  night: denies  Waking up with heart pounding or racing: denies  GERD or aspiration:denies         Parasomnia:   NREM - denies recurrent persistent confusional arousal, night eating, sleep walking or sleep terrors   REM  - denies dream enactment; injuries   Safety: No issues             Medications:     Current Outpatient Medications   Medication Sig Dispense Refill    albuterol (PROAIR HFA/PROVENTIL HFA/VENTOLIN HFA) 108 (90 Base) MCG/ACT inhaler Inhale 2 puffs into the lungs every 6 hours 18 g 1    metFORMIN (GLUCOPHAGE) 500 MG tablet Two tablets in the morning and one tablet in the evening. 270 tablet 3    diazepam (VALIUM) 2 MG tablet Take 1 tablet by mouth 3 times daily. TAKING PRN (Patient not taking: Reported on 1/28/2025)       No current facility-administered medications for this visit.        No Known Allergies         Past Medical History:     Does not need 02 supplement at night   Past Medical History:   Diagnosis Date    Gestational diabetes     Uncomplicated asthma              Past Surgical History:    Previous upper airway surgery - None   Past Surgical History:   Procedure Laterality Date    AS HYSTEROSCOPY W ENDOMETRIAL BX/POLYPECTOMY W/WO D&C N/A 12/05/2024    HD&C, endometrial polypectomy secondary to pmb    HYSTEROSCOPY DIAGNOSTIC N/A 12/05/2024    Procedure: Hysteroscopy dilation and curettage with myosure;  Surgeon: Hailey Perera DO;  Location:  OR            Social History:     Social History     Tobacco Use    Smoking status: Never     Passive exposure: Never    Smokeless tobacco: Never   Substance Use Topics    Alcohol use: Yes     Comment: sometimes          Chemical History:     Tobacco: Never      Uses 1  "cups/day of coffee. Last caffeine intake is usually before 3 am    Supplements for wakefulness: None    EtOH: Rare None of the patient's responses to the CAGE screening were positive / Negative CAGE score  Recreational Drugs: None     Psych Hx:        No data to display               Current dangers to self or others:none           Family History:     Family History   Problem Relation Age of Onset    Ovarian Cancer Mother     Other - See Comments Mother         fallopian tube cancer    Prostate Cancer Father     Diabetes Father     Cerebrovascular Disease Maternal Grandmother     Diabetes Paternal Grandmother     Diabetes Paternal Grandfather             Review of Systems:     A complete 10 point review of systems was negative other than HPI or as commented below:   Ninfa Simons has gained 5-10 pounds in 10 years.             Physical Examination:     /84   Pulse 82   Ht 1.588 m (5' 2.5\")   Wt 75.8 kg (167 lb)   LMP 08/01/2023 (Approximate)   SpO2 97%   BMI 30.06 kg/m    Exam:  Constitutional: healthy, alert, and no distress  Head: Normocephalic. No masses, lesions, tenderness or abnormalities  ENT: ENT exam normal, no neck nodes or sinus tenderness  Cardiovascular: negative, PMI normal. No lifts, heaves, or thrills. RRR. No murmurs, clicks gallops or rub  Respiratory: negative, Percussion normal. Good diaphragmatic excursion. Lungs clear  Gastrointestinal: Abdomen soft, non-tender. BS normal. No masses, organomegaly  : Deferred  Musculoskeletal: extremities normal- no gross deformities noted, gait normal, and normal muscle tone  Skin: no suspicious lesions or rashes  Neurologic: Gait normal. Reflexes normal and symmetric. Sensation grossly WNL.  Psychiatric: mentation appears normal and affect normal/bright         Data: All pertinent previous laboratory data reviewed     No results found for: \"PH\", \"PHARTERIAL\", \"PO2\", \"[id number]\", \"SAT\", \"PCO2\", \"HCO3\", \"BASEEXCESS\", \"DWIGHT\", \"BEB\"  Lab Results " "  Component Value Date    TSH 2.00 10/28/2022     Lab Results   Component Value Date    GLC 92 11/18/2024    GLC 85 09/25/2024     Lab Results   Component Value Date    HGB 13.3 11/18/2024    HGB 13.7 03/23/2020     Lab Results   Component Value Date    BUN 11.6 11/18/2024    BUN 16.7 09/25/2024    CR 0.86 11/18/2024    CR 0.91 09/25/2024     Lab Results   Component Value Date    AST 21 10/26/2023    ALT 19 10/26/2023    ALKPHOS 52 10/26/2023    BILITOTAL 0.4 10/26/2023     No results found for: \"UAMP\", \"UBARB\", \"BENZODIAZEUR\", \"UCANN\", \"UCOC\", \"OPIT\", \"UPCP\"        Copy to: Marilynn Lozano MD 1/28/2025       I spent 60 minutes on the date of the encounter doing chart review, history and exam, documentation and further coordination as noted above exclusive of time interpreting sleep study         "

## 2025-02-03 ENCOUNTER — TRANSFERRED RECORDS (OUTPATIENT)
Dept: HEALTH INFORMATION MANAGEMENT | Facility: CLINIC | Age: 55
End: 2025-02-03
Payer: COMMERCIAL

## 2025-02-10 ENCOUNTER — TRANSFERRED RECORDS (OUTPATIENT)
Dept: HEALTH INFORMATION MANAGEMENT | Facility: CLINIC | Age: 55
End: 2025-02-10
Payer: COMMERCIAL

## 2025-02-10 LAB — RETINOPATHY: NEGATIVE

## 2025-03-17 ENCOUNTER — OFFICE VISIT (OUTPATIENT)
Dept: INTERNAL MEDICINE | Facility: CLINIC | Age: 55
End: 2025-03-17
Payer: COMMERCIAL

## 2025-03-17 VITALS
OXYGEN SATURATION: 98 % | HEIGHT: 63 IN | DIASTOLIC BLOOD PRESSURE: 80 MMHG | SYSTOLIC BLOOD PRESSURE: 124 MMHG | RESPIRATION RATE: 18 BRPM | WEIGHT: 171.7 LBS | HEART RATE: 76 BPM | BODY MASS INDEX: 30.42 KG/M2

## 2025-03-17 DIAGNOSIS — K21.9 GASTROESOPHAGEAL REFLUX DISEASE WITHOUT ESOPHAGITIS: Primary | ICD-10-CM

## 2025-03-17 DIAGNOSIS — R10.9 ABDOMINAL DISCOMFORT: ICD-10-CM

## 2025-03-17 DIAGNOSIS — Z23 ENCOUNTER FOR VACCINATION: ICD-10-CM

## 2025-03-17 DIAGNOSIS — R73.03 PREDIABETES: ICD-10-CM

## 2025-03-17 DIAGNOSIS — E78.2 MIXED HYPERLIPIDEMIA: ICD-10-CM

## 2025-03-17 PROCEDURE — 99214 OFFICE O/P EST MOD 30 MIN: CPT | Mod: 25 | Performed by: INTERNAL MEDICINE

## 2025-03-17 PROCEDURE — 90471 IMMUNIZATION ADMIN: CPT | Performed by: INTERNAL MEDICINE

## 2025-03-17 PROCEDURE — 90750 HZV VACC RECOMBINANT IM: CPT | Performed by: INTERNAL MEDICINE

## 2025-03-17 PROCEDURE — 3079F DIAST BP 80-89 MM HG: CPT | Performed by: INTERNAL MEDICINE

## 2025-03-17 PROCEDURE — G2211 COMPLEX E/M VISIT ADD ON: HCPCS | Performed by: INTERNAL MEDICINE

## 2025-03-17 PROCEDURE — 3074F SYST BP LT 130 MM HG: CPT | Performed by: INTERNAL MEDICINE

## 2025-03-17 RX ORDER — OMEPRAZOLE 40 MG/1
40 CAPSULE, DELAYED RELEASE ORAL DAILY
Qty: 90 CAPSULE | Refills: 1 | Status: SHIPPED | OUTPATIENT
Start: 2025-03-17

## 2025-03-17 ASSESSMENT — ASTHMA QUESTIONNAIRES
QUESTION_3 LAST FOUR WEEKS HOW OFTEN DID YOUR ASTHMA SYMPTOMS (WHEEZING, COUGHING, SHORTNESS OF BREATH, CHEST TIGHTNESS OR PAIN) WAKE YOU UP AT NIGHT OR EARLIER THAN USUAL IN THE MORNING: ONCE OR TWICE
QUESTION_5 LAST FOUR WEEKS HOW WOULD YOU RATE YOUR ASTHMA CONTROL: COMPLETELY CONTROLLED
QUESTION_2 LAST FOUR WEEKS HOW OFTEN HAVE YOU HAD SHORTNESS OF BREATH: NOT AT ALL
ACT_TOTALSCORE: 23
ACT_TOTALSCORE: 23
QUESTION_1 LAST FOUR WEEKS HOW MUCH OF THE TIME DID YOUR ASTHMA KEEP YOU FROM GETTING AS MUCH DONE AT WORK, SCHOOL OR AT HOME: NONE OF THE TIME
QUESTION_4 LAST FOUR WEEKS HOW OFTEN HAVE YOU USED YOUR RESCUE INHALER OR NEBULIZER MEDICATION (SUCH AS ALBUTEROL): ONCE A WEEK OR LESS

## 2025-03-17 NOTE — PROGRESS NOTES
"  Assessment & Plan   Problem List Items Addressed This Visit          Nervous and Auditory    Abdominal discomfort     Intermittent right/central abdominal discomfort.  No significant correlation with bowel movements.  ET abdomen pelvis 9/2024 completely normal.  Colonoscopy 2/2025 unremarkable.   - Overall, reassured by normal work-up thus far  - Will have her trial fiber supplement  - F/up at physical             Digestive    Gastroesophageal reflux disease without esophagitis - Primary     Coughing that is worse at night, improves with Tums and was very noticeable during her colonoscopy seems consistent with GERD / LPR.   - Start omeprazole 40 mg daily   - Offered f/up 3 months, patient okay with 6 months  - If no improvement with PPI, would get EGD         Relevant Medications    omeprazole (PRILOSEC) 40 MG DR capsule       Endocrine    Prediabetes     A1c improved with crease of metformin dose last time.  -Continue metformin 1000 mg AM/500 mg PM   -Repeat A1c next time         Mixed hyperlipidemia     Last lipids 6/20/24 Tchol 222, HDL 93, , TG 61. Improved from previous with -140s with healthy lifestyle. ASCVD risk  is low 0.9%.   - Continue lifestyle management  - Does have strong family hx of strokes and CAD, would like to pursue CAC, which is reasonable, this is ordered         Relevant Orders    CT Coronary Calcium Scan     Other Visit Diagnoses       Encounter for vaccination        Relevant Orders    ZOSTER RECOMBINANT ADJUVANTED (SHINGRIX) (Completed)             The longitudinal plan of care for the diagnosis(es)/condition(s) as documented were addressed during this visit. Due to the added complexity in care, I will continue to support Ninfa in the subsequent management and with ongoing continuity of care.     BMI  Estimated body mass index is 30.9 kg/m  as calculated from the following:    Height as of this encounter: 1.588 m (5' 2.5\").    Weight as of this encounter: 77.9 kg (171 lb " 11.2 oz).   Weight management plan: Discussed healthy diet and exercise guidelines      I spent a total of 31 minutes on the day of the visit.   Time spent by me today doing chart review, history and exam, documentation and further activities per the note    FUTURE APPOINTMENTS:       - Follow-up for annual visit or as needed      Subjective   Ninfa is a 54 year old, presenting for the following health issues:  Follow Up (6 month follow up. ), Imm/Inj (2nd shingles ), and Abdominal Pain (Slight pain on the right side of the abdominal. When I press it I can feel the sore )        3/17/2025     3:57 PM   Additional Questions   Roomed by Antwan Na   Accompanied by N/A     History of Present Illness     Reason for visit:  Follow up routine and 2nd shingles vax     R sided abdominal pain: Unclear etiology. CT A/P 9/25/24 was completely normal.   - Points to R/central side of abdomen  - BM every day, prior to colonoscopy was having more diarrhea but now more regular and solid bowel movements   - Not necessarily tied to bowel movements     Pre-DM: we increased metformin 500 mg BID -> 1000 mg AM/500 mg PM (9/9/24)     PMB: S/p HD&C, endometrial polypectomy secondary to post menopausal bleeding on 12/5/2024.  GYN f/up 12/20/24, plan repeat US in 6 months.     HLD: she was going to look into cost of CAC last time     Weight:   Wt Readings from Last 4 Encounters:   03/17/25 77.9 kg (171 lb 11.2 oz)   01/28/25 75.8 kg (167 lb)   12/20/24 76 kg (167 lb 9.6 oz)   12/05/24 77.3 kg (170 lb 6.4 oz)     Fatigue: met with sleep 1/28/25, planning on HST.     Coughing at night. Wondering about acid reflux. Wakes up coughing. Has been taking TUMS and it helps immediately. Was told she coughed through whole colonoscopy. Had hx of PUD or GERD treated with tagamet in the past.       Review of Systems  Constitutional, neuro, ENT, endocrine, pulmonary, cardiac, gastrointestinal, genitourinary, musculoskeletal, integument and psychiatric  "systems are negative, except as otherwise noted.      Objective    /80   Pulse 76   Resp 18   Ht 1.588 m (5' 2.5\")   Wt 77.9 kg (171 lb 11.2 oz)   LMP 08/01/2023 (Approximate)   SpO2 98%   BMI 30.90 kg/m    Body mass index is 30.9 kg/m .  Physical Exam   GENERAL: healthy, alert and no distress  EYES: Eyes grossly normal to inspection and conjunctivae and sclerae normal  HENT: nose and mouth without ulcers or lesions  RESP: breathing comfortably and speaking in full sentences on room air with no respiratory distress or coughing  CV: warm and well perfused  ABDOMEN: soft, nontender, no mass or organomegaly, normal bowel sounds   SKIN: no suspicious lesions or rashes on exposed skin  NEURO: No focal deficits, mentation intact and speech normal  PSYCH: mentation appears normal, affect normal/bright          Signed Electronically by: Marilynn Lozano MD    "

## 2025-03-17 NOTE — ASSESSMENT & PLAN NOTE
A1c improved with crease of metformin dose last time.  -Continue metformin 1000 mg AM/500 mg PM   -Repeat A1c next time

## 2025-03-17 NOTE — ASSESSMENT & PLAN NOTE
Intermittent right/central abdominal discomfort.  No significant correlation with bowel movements.  ET abdomen pelvis 9/2024 completely normal.  Colonoscopy 2/2025 unremarkable.   - Overall, reassured by normal work-up thus far  - Will have her trial fiber supplement  - F/up at physical

## 2025-03-17 NOTE — ASSESSMENT & PLAN NOTE
Coughing that is worse at night, improves with Tums and was very noticeable during her colonoscopy seems consistent with GERD / LPR.   - Start omeprazole 40 mg daily   - Offered f/up 3 months, patient okay with 6 months  - If no improvement with PPI, would get EGD

## 2025-03-17 NOTE — PATIENT INSTRUCTIONS
Check in to cost of coronary calcium CT scan (coronary calcium score). Let me know if you want to pursue and I can order.     Start omeprazole 40 mg daily. Take first thing in the morning on empty stomach, 30-60 minutes before eating.     Add daily fiber supplement (metamucil or citrucel).

## 2025-03-17 NOTE — ASSESSMENT & PLAN NOTE
Last lipids 6/20/24 Tchol 222, HDL 93, , TG 61. Improved from previous with -140s with healthy lifestyle. ASCVD risk  is low 0.9%.   - Continue lifestyle management  - Does have strong family hx of strokes and CAD, would like to pursue CAC, which is reasonable, this is ordered

## 2025-04-14 ENCOUNTER — OFFICE VISIT (OUTPATIENT)
Dept: SLEEP MEDICINE | Facility: CLINIC | Age: 55
End: 2025-04-14
Payer: COMMERCIAL

## 2025-04-14 DIAGNOSIS — R53.82 CHRONIC FATIGUE: ICD-10-CM

## 2025-04-14 DIAGNOSIS — R06.83 SNORING: ICD-10-CM

## 2025-04-14 ASSESSMENT — SLEEP AND FATIGUE QUESTIONNAIRES
HOW LIKELY ARE YOU TO NOD OFF OR FALL ASLEEP WHILE SITTING QUIETLY AFTER LUNCH WITHOUT ALCOHOL: SLIGHT CHANCE OF DOZING
HOW LIKELY ARE YOU TO NOD OFF OR FALL ASLEEP WHILE WATCHING TV: HIGH CHANCE OF DOZING
HOW LIKELY ARE YOU TO NOD OFF OR FALL ASLEEP WHILE LYING DOWN TO REST IN THE AFTERNOON WHEN CIRCUMSTANCES PERMIT: WOULD NEVER DOZE
HOW LIKELY ARE YOU TO NOD OFF OR FALL ASLEEP WHILE SITTING AND TALKING TO SOMEONE: WOULD NEVER DOZE
HOW LIKELY ARE YOU TO NOD OFF OR FALL ASLEEP WHEN YOU ARE A PASSENGER IN A CAR FOR AN HOUR WITHOUT A BREAK: WOULD NEVER DOZE
HOW LIKELY ARE YOU TO NOD OFF OR FALL ASLEEP WHILE SITTING INACTIVE IN A PUBLIC PLACE: WOULD NEVER DOZE
HOW LIKELY ARE YOU TO NOD OFF OR FALL ASLEEP IN A CAR, WHILE STOPPED FOR A FEW MINUTES IN TRAFFIC: WOULD NEVER DOZE
HOW LIKELY ARE YOU TO NOD OFF OR FALL ASLEEP WHILE SITTING AND READING: SLIGHT CHANCE OF DOZING

## 2025-04-14 NOTE — PROGRESS NOTES
Pt is completing a home sleep test. Pt was instructed on how to put on the Noxturnal T3 device and associated equipment before going to bed and given the opportunity to practice putting it on before leaving the sleep center. Pt was reminded to bring the home sleep test kit back to the center tomorrow, at the scheduled time for download and reporting. Patient was instructed to complete study using the following treatment?  None  Neck circumference: 36 CM / 14 1/4 inches.  Device number: 09  Sweta Randolph CMA on 4/14/2025 at 9:58 AM

## 2025-04-15 ENCOUNTER — DOCUMENTATION ONLY (OUTPATIENT)
Dept: SLEEP MEDICINE | Facility: CLINIC | Age: 55
End: 2025-04-15
Payer: COMMERCIAL

## 2025-04-15 NOTE — PROGRESS NOTES
Pt returned HST device. It was downloaded and forwarded data to the clinical specialist for scoring.   Sweta Randolph CMA on 4/15/2025 at 10:43 AM

## 2025-05-21 ENCOUNTER — DOCUMENTATION ONLY (OUTPATIENT)
Dept: SLEEP MEDICINE | Facility: CLINIC | Age: 55
End: 2025-05-21
Payer: COMMERCIAL

## 2025-05-21 DIAGNOSIS — G47.36 HYPOXEMIA ASSOCIATED WITH SLEEP: Primary | ICD-10-CM

## 2025-05-21 NOTE — PROCEDURES
[facility]    Home Sleep Study Interpretation    Patient: Ninfa Simons  MRN: [id number]  YOB: 1970  Study Date: 4/14/2025  PCP/Referring Provider: Marilynn Lozano MD  Ordering Provider: Abdulaziz Griggs MD    Indications for Home Study: Ninfa is a 54 year old patient with a history of asthma and gestational diabetes who presents with symptoms suggestive of obstructive sleep apnea      Weight: 167.0 lbs  BMI: 30.1   Colbert: 5/24  STOP BANG: 3/8    Data: A full night home sleep study was performed recording the standard physiologic parameters including body position, movement, sound, nasal pressure, thermal oral airflow, chest and abdominal movements with respiratory inductance plethysmography, and oxygen saturation by pulse oximetry. Pulse rate was estimated by oximetry recording. This study was considered adequate based on > 4 hours of quality oximetry and respiratory recording. As specified by the AASM Manual for the Scoring of Sleep and Associated events, version 2.3, Rule VIII.D 1B, 4% oxygen desaturation scoring for hypopneas is used as a standard of care on all home sleep apnea testing.    Analysis Time: 530 minutes    Respiration:  Sleep Associated Hypoxemia: Sustained hypoxemia was not present. Baseline oxygen saturation was 96 %. Time with saturation less than 89% was 64.3 minutes. Time with saturation less than 90% was 215.5 minutes. The lowest oxygen saturation was 84.0%.  Snoring: Snoring was present 34% of the time with an average level of 70 dB. Duration of time snoring above 70 dB was 173.2 minutes.    Respiratory events: The home study revealed a presence of 2 obstructive apneas and 0 mixed and central apneas. There were 31 hypopneas resulting in a combined apnea/hypopnea index [AHI] of 4 events per hour with  5 per hour supine, 0  per hour prone, 0 per hour upright, 3  per hour left side, and 0 per hour right side.    Position: Percent of time spent:  Supine 36%, prone 0%, upright 0%, on left 64%, on right 0%.    Heart Rate: By Pulse Oximetry normal rate was noted.    Assessment:  No evidence of obstructive sleep apnea.  Sleep associated hypoxemia was present.    Recommendations:  Consider referral to pulmonary medicine for evaluation of sleep hypoxemia without WILVER  Suggest optimizing sleep hygiene and avoiding sleep deprivation  Weight management    Diagnosis Code(s): Hypoxemia G47.36    Electronically signed by: Abdulaziz Griggs MD May 21, 2025  Diplomate, American Board of Internal Medicine, Sleep Medicine

## 2025-06-03 ENCOUNTER — TELEPHONE (OUTPATIENT)
Dept: SLEEP MEDICINE | Facility: CLINIC | Age: 55
End: 2025-06-03
Payer: COMMERCIAL

## 2025-06-03 DIAGNOSIS — G47.36 HYPOXEMIA ASSOCIATED WITH SLEEP: Primary | ICD-10-CM

## 2025-06-03 NOTE — TELEPHONE ENCOUNTER
Patient called concerned about her sleep study results. Would like provider to follow up with her to discuss next steps.

## 2025-06-11 ENCOUNTER — MYC MEDICAL ADVICE (OUTPATIENT)
Dept: INTERNAL MEDICINE | Facility: CLINIC | Age: 55
End: 2025-06-11
Payer: COMMERCIAL

## 2025-06-12 NOTE — TELEPHONE ENCOUNTER
Below is what was discussed please advise on next steps.               Nervous and Auditory     Abdominal discomfort       Intermittent right/central abdominal discomfort.  No significant correlation with bowel movements.  ET abdomen pelvis 9/2024 completely normal.  Colonoscopy 2/2025 unremarkable.   - Overall, reassured by normal work-up thus far  - Will have her trial fiber supplement  - F/up at physical                 Digestive     Gastroesophageal reflux disease without esophagitis - Primary       Coughing that is worse at night, improves with Tums and was very noticeable during her colonoscopy seems consistent with GERD / LPR.   - Start omeprazole 40 mg daily   - Offered f/up 3 months, patient okay with 6 months  - If no improvement with PPI, would get EGD

## 2025-08-19 ENCOUNTER — OFFICE VISIT (OUTPATIENT)
Dept: SLEEP MEDICINE | Facility: CLINIC | Age: 55
End: 2025-08-19
Payer: COMMERCIAL

## 2025-08-19 VITALS
BODY MASS INDEX: 29.41 KG/M2 | HEART RATE: 65 BPM | WEIGHT: 166 LBS | HEIGHT: 63 IN | SYSTOLIC BLOOD PRESSURE: 125 MMHG | OXYGEN SATURATION: 95 % | DIASTOLIC BLOOD PRESSURE: 80 MMHG

## 2025-08-19 DIAGNOSIS — G47.36 HYPOXEMIA ASSOCIATED WITH SLEEP: Primary | ICD-10-CM

## 2025-08-19 PROCEDURE — 3079F DIAST BP 80-89 MM HG: CPT | Performed by: INTERNAL MEDICINE

## 2025-08-19 PROCEDURE — 99215 OFFICE O/P EST HI 40 MIN: CPT | Performed by: INTERNAL MEDICINE

## 2025-08-19 PROCEDURE — G2211 COMPLEX E/M VISIT ADD ON: HCPCS | Performed by: INTERNAL MEDICINE

## 2025-08-19 PROCEDURE — 1126F AMNT PAIN NOTED NONE PRSNT: CPT | Performed by: INTERNAL MEDICINE

## 2025-08-19 PROCEDURE — 3074F SYST BP LT 130 MM HG: CPT | Performed by: INTERNAL MEDICINE

## 2025-08-19 ASSESSMENT — SLEEP AND FATIGUE QUESTIONNAIRES
HOW LIKELY ARE YOU TO NOD OFF OR FALL ASLEEP WHILE LYING DOWN TO REST IN THE AFTERNOON WHEN CIRCUMSTANCES PERMIT: SLIGHT CHANCE OF DOZING
HOW LIKELY ARE YOU TO NOD OFF OR FALL ASLEEP WHEN YOU ARE A PASSENGER IN A CAR FOR AN HOUR WITHOUT A BREAK: WOULD NEVER DOZE
HOW LIKELY ARE YOU TO NOD OFF OR FALL ASLEEP WHILE SITTING AND READING: MODERATE CHANCE OF DOZING
HOW LIKELY ARE YOU TO NOD OFF OR FALL ASLEEP WHILE SITTING QUIETLY AFTER LUNCH WITHOUT ALCOHOL: WOULD NEVER DOZE
HOW LIKELY ARE YOU TO NOD OFF OR FALL ASLEEP IN A CAR, WHILE STOPPED FOR A FEW MINUTES IN TRAFFIC: WOULD NEVER DOZE
HOW LIKELY ARE YOU TO NOD OFF OR FALL ASLEEP WHILE WATCHING TV: SLIGHT CHANCE OF DOZING
HOW LIKELY ARE YOU TO NOD OFF OR FALL ASLEEP WHILE SITTING INACTIVE IN A PUBLIC PLACE: WOULD NEVER DOZE
HOW LIKELY ARE YOU TO NOD OFF OR FALL ASLEEP WHILE SITTING AND TALKING TO SOMEONE: WOULD NEVER DOZE

## (undated) RX ORDER — FENTANYL CITRATE 50 UG/ML
INJECTION, SOLUTION INTRAMUSCULAR; INTRAVENOUS
Status: DISPENSED
Start: 2024-12-05

## (undated) RX ORDER — ACETAMINOPHEN 325 MG/1
TABLET ORAL
Status: DISPENSED
Start: 2024-12-05

## (undated) RX ORDER — ONDANSETRON 2 MG/ML
INJECTION INTRAMUSCULAR; INTRAVENOUS
Status: DISPENSED
Start: 2024-12-05

## (undated) RX ORDER — KETOROLAC TROMETHAMINE 30 MG/ML
INJECTION, SOLUTION INTRAMUSCULAR; INTRAVENOUS
Status: DISPENSED
Start: 2024-12-05